# Patient Record
Sex: FEMALE | Race: WHITE | NOT HISPANIC OR LATINO | Employment: OTHER
[De-identification: names, ages, dates, MRNs, and addresses within clinical notes are randomized per-mention and may not be internally consistent; named-entity substitution may affect disease eponyms.]

---

## 2017-01-17 ENCOUNTER — RECORDS - HEALTHEAST (OUTPATIENT)
Dept: ADMINISTRATIVE | Facility: OTHER | Age: 52
End: 2017-01-17

## 2017-01-23 ENCOUNTER — RECORDS - HEALTHEAST (OUTPATIENT)
Dept: ADMINISTRATIVE | Facility: OTHER | Age: 52
End: 2017-01-23

## 2017-02-03 ENCOUNTER — RECORDS - HEALTHEAST (OUTPATIENT)
Dept: ADMINISTRATIVE | Facility: OTHER | Age: 52
End: 2017-02-03

## 2017-02-06 ENCOUNTER — RECORDS - HEALTHEAST (OUTPATIENT)
Dept: ADMINISTRATIVE | Facility: OTHER | Age: 52
End: 2017-02-06

## 2017-05-02 ENCOUNTER — COMMUNICATION - HEALTHEAST (OUTPATIENT)
Dept: TELEHEALTH | Facility: CLINIC | Age: 52
End: 2017-05-02

## 2017-05-02 ENCOUNTER — OFFICE VISIT - HEALTHEAST (OUTPATIENT)
Dept: FAMILY MEDICINE | Facility: CLINIC | Age: 52
End: 2017-05-02

## 2017-05-02 DIAGNOSIS — Z01.818 PREOP EXAMINATION: ICD-10-CM

## 2017-05-02 DIAGNOSIS — Z51.81 MEDICATION MONITORING ENCOUNTER: ICD-10-CM

## 2017-05-02 DIAGNOSIS — R87.619 ABNORMAL PAP SMEAR OF CERVIX: ICD-10-CM

## 2017-05-02 DIAGNOSIS — F30.9 BIPOLAR I DISORDER, SINGLE MANIC EPISODE (H): ICD-10-CM

## 2017-05-02 ASSESSMENT — MIFFLIN-ST. JEOR: SCORE: 1117.33

## 2017-05-03 LAB
ATRIAL RATE - MUSE: 68 BPM
DIASTOLIC BLOOD PRESSURE - MUSE: NORMAL MMHG
INTERPRETATION ECG - MUSE: NORMAL
P AXIS - MUSE: 77 DEGREES
PR INTERVAL - MUSE: 150 MS
QRS DURATION - MUSE: 86 MS
QT - MUSE: 440 MS
QTC - MUSE: 467 MS
R AXIS - MUSE: 53 DEGREES
SYSTOLIC BLOOD PRESSURE - MUSE: NORMAL MMHG
T AXIS - MUSE: 68 DEGREES
VENTRICULAR RATE- MUSE: 68 BPM

## 2017-05-04 ENCOUNTER — COMMUNICATION - HEALTHEAST (OUTPATIENT)
Dept: FAMILY MEDICINE | Facility: CLINIC | Age: 52
End: 2017-05-04

## 2017-05-04 ENCOUNTER — AMBULATORY - HEALTHEAST (OUTPATIENT)
Dept: FAMILY MEDICINE | Facility: CLINIC | Age: 52
End: 2017-05-04

## 2017-05-04 DIAGNOSIS — E87.6 HYPOKALEMIA: ICD-10-CM

## 2017-05-10 ENCOUNTER — AMBULATORY - HEALTHEAST (OUTPATIENT)
Dept: LAB | Facility: CLINIC | Age: 52
End: 2017-05-10

## 2017-05-10 DIAGNOSIS — Z13.220 SCREENING FOR LIPOID DISORDERS: ICD-10-CM

## 2017-05-10 DIAGNOSIS — Z51.81 MEDICATION MONITORING ENCOUNTER: ICD-10-CM

## 2017-05-10 LAB
CHOLEST SERPL-MCNC: 191 MG/DL
FASTING STATUS PATIENT QL REPORTED: YES
HDLC SERPL-MCNC: 62 MG/DL
LDLC SERPL CALC-MCNC: 93 MG/DL
TRIGL SERPL-MCNC: 180 MG/DL

## 2017-09-29 ENCOUNTER — RECORDS - HEALTHEAST (OUTPATIENT)
Dept: ADMINISTRATIVE | Facility: OTHER | Age: 52
End: 2017-09-29

## 2017-10-03 ENCOUNTER — COMMUNICATION - HEALTHEAST (OUTPATIENT)
Dept: FAMILY MEDICINE | Facility: CLINIC | Age: 52
End: 2017-10-03

## 2017-12-19 ENCOUNTER — COMMUNICATION - HEALTHEAST (OUTPATIENT)
Dept: FAMILY MEDICINE | Facility: CLINIC | Age: 52
End: 2017-12-19

## 2017-12-20 ENCOUNTER — COMMUNICATION - HEALTHEAST (OUTPATIENT)
Dept: SCHEDULING | Facility: CLINIC | Age: 52
End: 2017-12-20

## 2017-12-20 ENCOUNTER — COMMUNICATION - HEALTHEAST (OUTPATIENT)
Dept: FAMILY MEDICINE | Facility: CLINIC | Age: 52
End: 2017-12-20

## 2017-12-20 DIAGNOSIS — Z30.9 ENCOUNTER FOR CONTRACEPTIVE MANAGEMENT, UNSPECIFIED TYPE: ICD-10-CM

## 2018-01-26 ENCOUNTER — OFFICE VISIT - HEALTHEAST (OUTPATIENT)
Dept: FAMILY MEDICINE | Facility: CLINIC | Age: 53
End: 2018-01-26

## 2018-01-26 DIAGNOSIS — B00.1 RECURRENT COLD SORES: ICD-10-CM

## 2018-01-26 DIAGNOSIS — Z12.31 VISIT FOR SCREENING MAMMOGRAM: ICD-10-CM

## 2018-01-26 DIAGNOSIS — Z00.00 ANNUAL PHYSICAL EXAM: ICD-10-CM

## 2018-01-26 LAB
ALBUMIN SERPL-MCNC: 3.4 G/DL (ref 3.5–5)
ALP SERPL-CCNC: 45 U/L (ref 45–120)
ALT SERPL W P-5'-P-CCNC: 10 U/L (ref 0–45)
ANION GAP SERPL CALCULATED.3IONS-SCNC: 10 MMOL/L (ref 5–18)
AST SERPL W P-5'-P-CCNC: 14 U/L (ref 0–40)
BILIRUB SERPL-MCNC: 0.2 MG/DL (ref 0–1)
BUN SERPL-MCNC: 26 MG/DL (ref 8–22)
CALCIUM SERPL-MCNC: 8.6 MG/DL (ref 8.5–10.5)
CHLORIDE BLD-SCNC: 110 MMOL/L (ref 98–107)
CHOLEST SERPL-MCNC: 190 MG/DL
CO2 SERPL-SCNC: 20 MMOL/L (ref 22–31)
CREAT SERPL-MCNC: 1.05 MG/DL (ref 0.6–1.1)
FASTING STATUS PATIENT QL REPORTED: NO
GFR SERPL CREATININE-BSD FRML MDRD: 55 ML/MIN/1.73M2
GLUCOSE BLD-MCNC: 101 MG/DL (ref 70–125)
HDLC SERPL-MCNC: 68 MG/DL
LDLC SERPL CALC-MCNC: 87 MG/DL
POTASSIUM BLD-SCNC: 3.9 MMOL/L (ref 3.5–5)
PROT SERPL-MCNC: 6.5 G/DL (ref 6–8)
SODIUM SERPL-SCNC: 140 MMOL/L (ref 136–145)
TRIGL SERPL-MCNC: 175 MG/DL

## 2018-01-26 RX ORDER — VALACYCLOVIR HYDROCHLORIDE 1 G/1
TABLET, FILM COATED ORAL
Qty: 20 TABLET | Refills: 3 | Status: SHIPPED | OUTPATIENT
Start: 2018-01-26

## 2018-01-26 ASSESSMENT — MIFFLIN-ST. JEOR: SCORE: 1108.2

## 2018-03-01 ENCOUNTER — COMMUNICATION - HEALTHEAST (OUTPATIENT)
Dept: FAMILY MEDICINE | Facility: CLINIC | Age: 53
End: 2018-03-01

## 2018-03-01 ENCOUNTER — AMBULATORY - HEALTHEAST (OUTPATIENT)
Dept: LAB | Facility: CLINIC | Age: 53
End: 2018-03-01

## 2018-03-01 DIAGNOSIS — R68.2 DRY MOUTH: ICD-10-CM

## 2018-03-01 DIAGNOSIS — H04.123 CHRONIC DRYNESS OF BOTH EYES: ICD-10-CM

## 2018-03-06 LAB
SS-A/RO AUTOANTIBODIES - HISTORICAL: 1 EU
SS-B/LA AUTOANTIBODIES - HISTORICAL: 0 EU

## 2018-03-12 ENCOUNTER — COMMUNICATION - HEALTHEAST (OUTPATIENT)
Dept: SCHEDULING | Facility: CLINIC | Age: 53
End: 2018-03-12

## 2018-08-16 ENCOUNTER — AMBULATORY - HEALTHEAST (OUTPATIENT)
Dept: FAMILY MEDICINE | Facility: CLINIC | Age: 53
End: 2018-08-16

## 2018-08-16 DIAGNOSIS — Z00.00 PREVENTATIVE HEALTH CARE: ICD-10-CM

## 2018-12-28 ENCOUNTER — RECORDS - HEALTHEAST (OUTPATIENT)
Dept: ADMINISTRATIVE | Facility: OTHER | Age: 53
End: 2018-12-28

## 2019-01-10 ENCOUNTER — COMMUNICATION - HEALTHEAST (OUTPATIENT)
Dept: FAMILY MEDICINE | Facility: CLINIC | Age: 54
End: 2019-01-10

## 2019-04-01 ENCOUNTER — COMMUNICATION - HEALTHEAST (OUTPATIENT)
Dept: FAMILY MEDICINE | Facility: CLINIC | Age: 54
End: 2019-04-01

## 2019-04-02 ENCOUNTER — RECORDS - HEALTHEAST (OUTPATIENT)
Dept: ADMINISTRATIVE | Facility: OTHER | Age: 54
End: 2019-04-02

## 2019-04-04 ENCOUNTER — RECORDS - HEALTHEAST (OUTPATIENT)
Dept: ADMINISTRATIVE | Facility: OTHER | Age: 54
End: 2019-04-04

## 2019-04-28 ENCOUNTER — COMMUNICATION - HEALTHEAST (OUTPATIENT)
Dept: FAMILY MEDICINE | Facility: CLINIC | Age: 54
End: 2019-04-28

## 2019-05-02 ENCOUNTER — COMMUNICATION - HEALTHEAST (OUTPATIENT)
Dept: FAMILY MEDICINE | Facility: CLINIC | Age: 54
End: 2019-05-02

## 2019-05-14 ENCOUNTER — OFFICE VISIT - HEALTHEAST (OUTPATIENT)
Dept: FAMILY MEDICINE | Facility: CLINIC | Age: 54
End: 2019-05-14

## 2019-05-14 DIAGNOSIS — F30.9 BIPOLAR I DISORDER, SINGLE MANIC EPISODE (H): ICD-10-CM

## 2019-05-14 DIAGNOSIS — E78.1 PURE HYPERTRIGLYCERIDEMIA: ICD-10-CM

## 2019-05-14 DIAGNOSIS — Z00.00 ANNUAL PHYSICAL EXAM: ICD-10-CM

## 2019-05-14 DIAGNOSIS — N95.9 MENOPAUSAL AND POSTMENOPAUSAL DISORDER: ICD-10-CM

## 2019-05-14 LAB
ALBUMIN SERPL-MCNC: 4 G/DL (ref 3.5–5)
ALP SERPL-CCNC: 65 U/L (ref 45–120)
ALT SERPL W P-5'-P-CCNC: 20 U/L (ref 0–45)
ANION GAP SERPL CALCULATED.3IONS-SCNC: 12 MMOL/L (ref 5–18)
AST SERPL W P-5'-P-CCNC: 27 U/L (ref 0–40)
BASOPHILS # BLD AUTO: 0 THOU/UL (ref 0–0.2)
BASOPHILS NFR BLD AUTO: 1 % (ref 0–2)
BILIRUB SERPL-MCNC: 0.3 MG/DL (ref 0–1)
BUN SERPL-MCNC: 28 MG/DL (ref 8–22)
CALCIUM SERPL-MCNC: 9.3 MG/DL (ref 8.5–10.5)
CHLORIDE BLD-SCNC: 106 MMOL/L (ref 98–107)
CHOLEST SERPL-MCNC: 228 MG/DL
CO2 SERPL-SCNC: 20 MMOL/L (ref 22–31)
CREAT SERPL-MCNC: 1.15 MG/DL (ref 0.6–1.1)
EOSINOPHIL # BLD AUTO: 0.4 THOU/UL (ref 0–0.4)
EOSINOPHIL NFR BLD AUTO: 7 % (ref 0–6)
ERYTHROCYTE [DISTWIDTH] IN BLOOD BY AUTOMATED COUNT: 13.1 % (ref 11–14.5)
FASTING STATUS PATIENT QL REPORTED: YES
FSH SERPL-ACNC: 55.4 MIU/ML
GFR SERPL CREATININE-BSD FRML MDRD: 49 ML/MIN/1.73M2
GLUCOSE BLD-MCNC: 84 MG/DL (ref 70–125)
HCT VFR BLD AUTO: 42.7 % (ref 35–47)
HDLC SERPL-MCNC: 66 MG/DL
HGB BLD-MCNC: 14 G/DL (ref 12–16)
LDLC SERPL CALC-MCNC: 146 MG/DL
LYMPHOCYTES # BLD AUTO: 1.9 THOU/UL (ref 0.8–4.4)
LYMPHOCYTES NFR BLD AUTO: 32 % (ref 20–40)
MCH RBC QN AUTO: 32.4 PG (ref 27–34)
MCHC RBC AUTO-ENTMCNC: 32.8 G/DL (ref 32–36)
MCV RBC AUTO: 99 FL (ref 80–100)
MONOCYTES # BLD AUTO: 0.5 THOU/UL (ref 0–0.9)
MONOCYTES NFR BLD AUTO: 8 % (ref 2–10)
NEUTROPHILS # BLD AUTO: 3.2 THOU/UL (ref 2–7.7)
NEUTROPHILS NFR BLD AUTO: 53 % (ref 50–70)
PLATELET # BLD AUTO: 222 THOU/UL (ref 140–440)
PMV BLD AUTO: 7.3 FL (ref 7–10)
POTASSIUM BLD-SCNC: 4 MMOL/L (ref 3.5–5)
PROT SERPL-MCNC: 6.9 G/DL (ref 6–8)
RBC # BLD AUTO: 4.32 MILL/UL (ref 3.8–5.4)
SODIUM SERPL-SCNC: 138 MMOL/L (ref 136–145)
TRIGL SERPL-MCNC: 80 MG/DL
WBC: 6 THOU/UL (ref 4–11)

## 2019-05-14 ASSESSMENT — MIFFLIN-ST. JEOR: SCORE: 1085.97

## 2019-05-15 LAB
25(OH)D3 SERPL-MCNC: 32.9 NG/ML (ref 30–80)
25(OH)D3 SERPL-MCNC: 32.9 NG/ML (ref 30–80)

## 2019-06-03 ENCOUNTER — COMMUNICATION - HEALTHEAST (OUTPATIENT)
Dept: FAMILY MEDICINE | Facility: CLINIC | Age: 54
End: 2019-06-03

## 2019-06-04 ENCOUNTER — AMBULATORY - HEALTHEAST (OUTPATIENT)
Dept: FAMILY MEDICINE | Facility: CLINIC | Age: 54
End: 2019-06-04

## 2019-06-04 ENCOUNTER — COMMUNICATION - HEALTHEAST (OUTPATIENT)
Dept: FAMILY MEDICINE | Facility: CLINIC | Age: 54
End: 2019-06-04

## 2019-06-05 ENCOUNTER — COMMUNICATION - HEALTHEAST (OUTPATIENT)
Dept: FAMILY MEDICINE | Facility: CLINIC | Age: 54
End: 2019-06-05

## 2019-09-23 ENCOUNTER — RECORDS - HEALTHEAST (OUTPATIENT)
Dept: ADMINISTRATIVE | Facility: OTHER | Age: 54
End: 2019-09-23

## 2019-11-11 ENCOUNTER — COMMUNICATION - HEALTHEAST (OUTPATIENT)
Dept: SCHEDULING | Facility: CLINIC | Age: 54
End: 2019-11-11

## 2019-11-12 ENCOUNTER — OFFICE VISIT - HEALTHEAST (OUTPATIENT)
Dept: FAMILY MEDICINE | Facility: CLINIC | Age: 54
End: 2019-11-12

## 2019-11-12 DIAGNOSIS — N95.1 MENOPAUSAL SYNDROME (HOT FLASHES): ICD-10-CM

## 2019-11-12 DIAGNOSIS — M79.89 SWELLING OF BOTH HANDS: ICD-10-CM

## 2019-11-12 DIAGNOSIS — F41.9 ANXIETY: ICD-10-CM

## 2019-11-12 DIAGNOSIS — R52 BODY ACHES: ICD-10-CM

## 2019-11-12 DIAGNOSIS — M25.552 HIP PAIN, LEFT: ICD-10-CM

## 2019-11-12 DIAGNOSIS — Z23 IMMUNIZATION DUE: ICD-10-CM

## 2019-11-12 DIAGNOSIS — R53.82 CHRONIC FATIGUE: ICD-10-CM

## 2019-11-12 DIAGNOSIS — Z11.59 ENCOUNTER FOR HEPATITIS C SCREENING TEST FOR LOW RISK PATIENT: ICD-10-CM

## 2019-11-12 DIAGNOSIS — Z00.00 PREVENTATIVE HEALTH CARE: ICD-10-CM

## 2019-11-12 LAB
ALBUMIN UR-MCNC: NEGATIVE MG/DL
APPEARANCE UR: ABNORMAL
BILIRUB UR QL STRIP: NEGATIVE
COLOR UR AUTO: YELLOW
ERYTHROCYTE [DISTWIDTH] IN BLOOD BY AUTOMATED COUNT: 12.7 % (ref 11–14.5)
ERYTHROCYTE [SEDIMENTATION RATE] IN BLOOD BY WESTERGREN METHOD: 8 MM/HR (ref 0–20)
GLUCOSE UR STRIP-MCNC: NEGATIVE MG/DL
HCT VFR BLD AUTO: 38.2 % (ref 35–47)
HGB BLD-MCNC: 13 G/DL (ref 12–16)
HGB UR QL STRIP: NEGATIVE
KETONES UR STRIP-MCNC: NEGATIVE MG/DL
LEUKOCYTE ESTERASE UR QL STRIP: NEGATIVE
MCH RBC QN AUTO: 32.5 PG (ref 27–34)
MCHC RBC AUTO-ENTMCNC: 34.1 G/DL (ref 32–36)
MCV RBC AUTO: 95 FL (ref 80–100)
NITRATE UR QL: NEGATIVE
PH UR STRIP: 8.5 [PH] (ref 5–8)
PLATELET # BLD AUTO: 209 THOU/UL (ref 140–440)
PMV BLD AUTO: 7 FL (ref 7–10)
RBC # BLD AUTO: 4.01 MILL/UL (ref 3.8–5.4)
SP GR UR STRIP: 1.01 (ref 1–1.03)
UROBILINOGEN UR STRIP-ACNC: ABNORMAL
WBC: 5.2 THOU/UL (ref 4–11)

## 2019-11-12 ASSESSMENT — MIFFLIN-ST. JEOR: SCORE: 1097.77

## 2019-11-13 LAB
ALBUMIN SERPL-MCNC: 3.9 G/DL (ref 3.5–5)
ALP SERPL-CCNC: 57 U/L (ref 45–120)
ALT SERPL W P-5'-P-CCNC: 24 U/L (ref 0–45)
ANION GAP SERPL CALCULATED.3IONS-SCNC: 9 MMOL/L (ref 5–18)
AST SERPL W P-5'-P-CCNC: 26 U/L (ref 0–40)
BILIRUB SERPL-MCNC: 0.2 MG/DL (ref 0–1)
BUN SERPL-MCNC: 33 MG/DL (ref 8–22)
CALCIUM SERPL-MCNC: 9 MG/DL (ref 8.5–10.5)
CHLORIDE BLD-SCNC: 109 MMOL/L (ref 98–107)
CK SERPL-CCNC: 68 U/L (ref 30–190)
CO2 SERPL-SCNC: 24 MMOL/L (ref 22–31)
CREAT SERPL-MCNC: 1.16 MG/DL (ref 0.6–1.1)
GFR SERPL CREATININE-BSD FRML MDRD: 49 ML/MIN/1.73M2
GLUCOSE BLD-MCNC: 88 MG/DL (ref 70–125)
POTASSIUM BLD-SCNC: 4.5 MMOL/L (ref 3.5–5)
PROT SERPL-MCNC: 6.6 G/DL (ref 6–8)
SODIUM SERPL-SCNC: 142 MMOL/L (ref 136–145)
TSH SERPL DL<=0.005 MIU/L-ACNC: 3.11 UIU/ML (ref 0.3–5)

## 2019-11-14 LAB — HCV AB SERPL QL IA: NEGATIVE

## 2019-11-20 ENCOUNTER — COMMUNICATION - HEALTHEAST (OUTPATIENT)
Dept: SCHEDULING | Facility: CLINIC | Age: 54
End: 2019-11-20

## 2019-12-10 ENCOUNTER — COMMUNICATION - HEALTHEAST (OUTPATIENT)
Dept: FAMILY MEDICINE | Facility: CLINIC | Age: 54
End: 2019-12-10

## 2019-12-20 ENCOUNTER — OFFICE VISIT - HEALTHEAST (OUTPATIENT)
Dept: FAMILY MEDICINE | Facility: CLINIC | Age: 54
End: 2019-12-20

## 2019-12-20 DIAGNOSIS — R68.2 DRY MOUTH: ICD-10-CM

## 2019-12-20 DIAGNOSIS — M79.642 PAIN IN BOTH HANDS: ICD-10-CM

## 2019-12-20 DIAGNOSIS — F41.0 PANIC ATTACK: ICD-10-CM

## 2019-12-20 DIAGNOSIS — H18.529: ICD-10-CM

## 2019-12-20 DIAGNOSIS — M79.641 PAIN IN BOTH HANDS: ICD-10-CM

## 2019-12-20 DIAGNOSIS — F41.9 ANXIETY: ICD-10-CM

## 2019-12-20 DIAGNOSIS — F51.04 PSYCHOPHYSIOLOGICAL INSOMNIA: ICD-10-CM

## 2019-12-20 DIAGNOSIS — H04.123 DRY EYES: ICD-10-CM

## 2019-12-20 DIAGNOSIS — N95.1 MENOPAUSAL SYNDROME (HOT FLASHES): ICD-10-CM

## 2019-12-23 ENCOUNTER — COMMUNICATION - HEALTHEAST (OUTPATIENT)
Dept: FAMILY MEDICINE | Facility: CLINIC | Age: 54
End: 2019-12-23

## 2019-12-23 DIAGNOSIS — M25.50 ARTHRALGIA OF MULTIPLE JOINTS: ICD-10-CM

## 2020-01-06 ENCOUNTER — RECORDS - HEALTHEAST (OUTPATIENT)
Dept: ADMINISTRATIVE | Facility: OTHER | Age: 55
End: 2020-01-06

## 2020-01-08 ENCOUNTER — RECORDS - HEALTHEAST (OUTPATIENT)
Dept: ADMINISTRATIVE | Facility: OTHER | Age: 55
End: 2020-01-08

## 2020-01-10 ENCOUNTER — COMMUNICATION - HEALTHEAST (OUTPATIENT)
Dept: FAMILY MEDICINE | Facility: CLINIC | Age: 55
End: 2020-01-10

## 2020-01-21 ENCOUNTER — OFFICE VISIT - HEALTHEAST (OUTPATIENT)
Dept: FAMILY MEDICINE | Facility: CLINIC | Age: 55
End: 2020-01-21

## 2020-01-21 DIAGNOSIS — M25.552 PAIN OF BOTH HIP JOINTS: ICD-10-CM

## 2020-01-21 DIAGNOSIS — M79.642 PAIN IN BOTH HANDS: ICD-10-CM

## 2020-01-21 DIAGNOSIS — F41.9 ANXIETY: ICD-10-CM

## 2020-01-21 DIAGNOSIS — M25.551 PAIN OF BOTH HIP JOINTS: ICD-10-CM

## 2020-01-21 DIAGNOSIS — E07.9 THYROID DISEASE: ICD-10-CM

## 2020-01-21 DIAGNOSIS — M19.019 ARTHROPATHY OF SHOULDER REGION: ICD-10-CM

## 2020-01-21 DIAGNOSIS — R79.89 ELEVATED SERUM CREATININE: ICD-10-CM

## 2020-01-21 DIAGNOSIS — M79.641 PAIN IN BOTH HANDS: ICD-10-CM

## 2020-01-21 DIAGNOSIS — R53.82 CHRONIC FATIGUE: ICD-10-CM

## 2020-01-21 LAB
ANION GAP SERPL CALCULATED.3IONS-SCNC: 8 MMOL/L (ref 5–18)
BUN SERPL-MCNC: 25 MG/DL (ref 8–22)
CALCIUM SERPL-MCNC: 9.5 MG/DL (ref 8.5–10.5)
CHLORIDE BLD-SCNC: 106 MMOL/L (ref 98–107)
CO2 SERPL-SCNC: 27 MMOL/L (ref 22–31)
CREAT SERPL-MCNC: 1.18 MG/DL (ref 0.6–1.1)
GFR SERPL CREATININE-BSD FRML MDRD: 48 ML/MIN/1.73M2
GLUCOSE BLD-MCNC: 100 MG/DL (ref 70–125)
POTASSIUM BLD-SCNC: 4.2 MMOL/L (ref 3.5–5)
SODIUM SERPL-SCNC: 141 MMOL/L (ref 136–145)
T3 SERPL-MCNC: 58 NG/DL (ref 45–175)
T4 FREE SERPL-MCNC: 0.5 NG/DL (ref 0.7–1.8)
T4 TOTAL - HISTORICAL: <3 UG/DL (ref 4.5–13)
TSH SERPL DL<=0.005 MIU/L-ACNC: 2.66 UIU/ML (ref 0.3–5)

## 2020-01-23 ENCOUNTER — COMMUNICATION - HEALTHEAST (OUTPATIENT)
Dept: FAMILY MEDICINE | Facility: CLINIC | Age: 55
End: 2020-01-23

## 2020-01-23 ENCOUNTER — RECORDS - HEALTHEAST (OUTPATIENT)
Dept: ADMINISTRATIVE | Facility: OTHER | Age: 55
End: 2020-01-23

## 2020-01-24 ENCOUNTER — COMMUNICATION - HEALTHEAST (OUTPATIENT)
Dept: ADMINISTRATIVE | Facility: CLINIC | Age: 55
End: 2020-01-24

## 2020-02-03 ENCOUNTER — COMMUNICATION - HEALTHEAST (OUTPATIENT)
Dept: FAMILY MEDICINE | Facility: CLINIC | Age: 55
End: 2020-02-03

## 2020-02-11 ENCOUNTER — OFFICE VISIT - HEALTHEAST (OUTPATIENT)
Dept: FAMILY MEDICINE | Facility: CLINIC | Age: 55
End: 2020-02-11

## 2020-02-11 DIAGNOSIS — F51.04 PSYCHOPHYSIOLOGICAL INSOMNIA: ICD-10-CM

## 2020-02-11 DIAGNOSIS — F41.0 PANIC ATTACK: ICD-10-CM

## 2020-02-11 DIAGNOSIS — F30.9 BIPOLAR I DISORDER, SINGLE MANIC EPISODE (H): ICD-10-CM

## 2020-02-11 DIAGNOSIS — R87.611 PAP SMEAR OF CERVIX WITH ASCUS, CANNOT EXCLUDE HGSIL: ICD-10-CM

## 2020-02-11 RX ORDER — LORAZEPAM 0.5 MG/1
0.5 TABLET ORAL EVERY 8 HOURS PRN
Qty: 30 TABLET | Refills: 0 | Status: SHIPPED | OUTPATIENT
Start: 2020-02-11

## 2020-02-11 ASSESSMENT — MIFFLIN-ST. JEOR: SCORE: 1088.24

## 2020-02-12 LAB
HPV SOURCE: NORMAL
HUMAN PAPILLOMA VIRUS 16 DNA: NEGATIVE
HUMAN PAPILLOMA VIRUS 18 DNA: NEGATIVE
HUMAN PAPILLOMA VIRUS FINAL DIAGNOSIS: NORMAL
HUMAN PAPILLOMA VIRUS OTHER HR: NEGATIVE
SPECIMEN DESCRIPTION: NORMAL

## 2020-02-17 ENCOUNTER — OFFICE VISIT - HEALTHEAST (OUTPATIENT)
Dept: RHEUMATOLOGY | Facility: CLINIC | Age: 55
End: 2020-02-17

## 2020-02-17 ENCOUNTER — RECORDS - HEALTHEAST (OUTPATIENT)
Dept: GENERAL RADIOLOGY | Facility: CLINIC | Age: 55
End: 2020-02-17

## 2020-02-17 DIAGNOSIS — M70.62 TROCHANTERIC BURSITIS, LEFT HIP: ICD-10-CM

## 2020-02-17 DIAGNOSIS — R20.2 PARESTHESIA OF UPPER EXTREMITY: ICD-10-CM

## 2020-02-17 DIAGNOSIS — M79.641 PAIN IN BOTH HANDS: ICD-10-CM

## 2020-02-17 DIAGNOSIS — M79.642 PAIN IN BOTH HANDS: ICD-10-CM

## 2020-02-17 DIAGNOSIS — M54.50 CHRONIC LEFT-SIDED LOW BACK PAIN WITHOUT SCIATICA: ICD-10-CM

## 2020-02-17 DIAGNOSIS — G89.29 CHRONIC LEFT-SIDED LOW BACK PAIN WITHOUT SCIATICA: ICD-10-CM

## 2020-02-17 DIAGNOSIS — H18.599 OTHER HEREDITARY CORNEAL DYSTROPHIES: ICD-10-CM

## 2020-02-17 DIAGNOSIS — N28.9 RENAL INSUFFICIENCY: ICD-10-CM

## 2020-02-17 DIAGNOSIS — M79.642 PAIN IN LEFT HAND: ICD-10-CM

## 2020-02-17 DIAGNOSIS — M79.641 PAIN IN RIGHT HAND: ICD-10-CM

## 2020-02-17 DIAGNOSIS — R20.2 PARESTHESIA OF RIGHT LOWER EXTREMITY: ICD-10-CM

## 2020-02-17 DIAGNOSIS — M65.341 TRIGGER RING FINGER OF RIGHT HAND: ICD-10-CM

## 2020-02-17 DIAGNOSIS — H18.529: ICD-10-CM

## 2020-02-17 LAB
ALT SERPL W P-5'-P-CCNC: 31 U/L (ref 0–45)
AST SERPL W P-5'-P-CCNC: 37 U/L (ref 0–40)
C REACTIVE PROTEIN LHE: 0.3 MG/DL (ref 0–0.8)
CREAT SERPL-MCNC: 1.22 MG/DL (ref 0.6–1.1)
ERYTHROCYTE [DISTWIDTH] IN BLOOD BY AUTOMATED COUNT: 11.9 % (ref 11–14.5)
ERYTHROCYTE [SEDIMENTATION RATE] IN BLOOD BY WESTERGREN METHOD: 1 MM/HR (ref 0–20)
FOLATE SERPL-MCNC: 15.8 NG/ML
GFR SERPL CREATININE-BSD FRML MDRD: 46 ML/MIN/1.73M2
HCT VFR BLD AUTO: 43.6 % (ref 35–47)
HGB BLD-MCNC: 14 G/DL (ref 12–16)
MCH RBC QN AUTO: 31.1 PG (ref 27–34)
MCHC RBC AUTO-ENTMCNC: 32 G/DL (ref 32–36)
MCV RBC AUTO: 97 FL (ref 80–100)
PLATELET # BLD AUTO: 249 THOU/UL (ref 140–440)
PMV BLD AUTO: 7 FL (ref 7–10)
RBC # BLD AUTO: 4.49 MILL/UL (ref 3.8–5.4)
RHEUMATOID FACT SERPL-ACNC: <15 IU/ML (ref 0–30)
URATE SERPL-MCNC: 4.9 MG/DL (ref 2–7.5)
VIT B12 SERPL-MCNC: 641 PG/ML (ref 213–816)
WBC: 4.9 THOU/UL (ref 4–11)

## 2020-02-17 ASSESSMENT — MIFFLIN-ST. JEOR: SCORE: 1093.69

## 2020-02-18 LAB
ANA SER QL: 0.1 U
B BURGDOR IGG+IGM SER QL: 0.07 INDEX VALUE
CCP AB SER IA-ACNC: <0.5 U/ML

## 2020-02-19 LAB
ACE SERPL-CCNC: 31 U/L (ref 9–67)
ANCA IGG TITR SER IF: NORMAL {TITER}
B LOCUS: NORMAL
B27TEST METHOD: NORMAL

## 2020-02-20 LAB
BKR LAB AP ABNORMAL BLEEDING: NO
BKR LAB AP BIRTH CONTROL/HORMONES: NORMAL
BKR LAB AP CERVICAL APPEARANCE: NORMAL
BKR LAB AP GYN ADEQUACY: NORMAL
BKR LAB AP GYN INTERPRETATION: NORMAL
BKR LAB AP HPV REFLEX: NORMAL
BKR LAB AP LMP: NORMAL
BKR LAB AP PATIENT STATUS: NORMAL
BKR LAB AP PREVIOUS ABNORMAL: NORMAL
BKR LAB AP PREVIOUS NORMAL: NORMAL
HIGH RISK?: YES
PATH REPORT.COMMENTS IMP SPEC: NORMAL
RESULT FLAG (HE HISTORICAL CONVERSION): NORMAL

## 2020-02-26 ENCOUNTER — COMMUNICATION - HEALTHEAST (OUTPATIENT)
Dept: RHEUMATOLOGY | Facility: CLINIC | Age: 55
End: 2020-02-26

## 2020-02-26 DIAGNOSIS — M79.641 PAIN IN BOTH HANDS: ICD-10-CM

## 2020-02-26 DIAGNOSIS — M79.642 PAIN IN BOTH HANDS: ICD-10-CM

## 2020-02-27 ENCOUNTER — OFFICE VISIT - HEALTHEAST (OUTPATIENT)
Dept: OCCUPATIONAL THERAPY | Facility: REHABILITATION | Age: 55
End: 2020-02-27

## 2020-02-27 DIAGNOSIS — Z78.9 DECREASED ACTIVITIES OF DAILY LIVING (ADL): ICD-10-CM

## 2020-02-27 DIAGNOSIS — R29.898 WEAKNESS OF BOTH HANDS: ICD-10-CM

## 2020-02-27 DIAGNOSIS — M79.644 PAIN IN FINGER OF BOTH HANDS: ICD-10-CM

## 2020-02-27 DIAGNOSIS — M79.645 PAIN IN FINGER OF BOTH HANDS: ICD-10-CM

## 2020-02-28 ENCOUNTER — OFFICE VISIT - HEALTHEAST (OUTPATIENT)
Dept: OCCUPATIONAL THERAPY | Facility: REHABILITATION | Age: 55
End: 2020-02-28

## 2020-02-28 DIAGNOSIS — R29.898 WEAKNESS OF BOTH HANDS: ICD-10-CM

## 2020-02-28 DIAGNOSIS — M79.645 PAIN IN FINGER OF BOTH HANDS: ICD-10-CM

## 2020-02-28 DIAGNOSIS — Z78.9 DECREASED ACTIVITIES OF DAILY LIVING (ADL): ICD-10-CM

## 2020-02-28 DIAGNOSIS — M79.644 PAIN IN FINGER OF BOTH HANDS: ICD-10-CM

## 2020-03-02 ENCOUNTER — COMMUNICATION - HEALTHEAST (OUTPATIENT)
Dept: FAMILY MEDICINE | Facility: CLINIC | Age: 55
End: 2020-03-02

## 2020-03-10 ENCOUNTER — OFFICE VISIT - HEALTHEAST (OUTPATIENT)
Dept: OCCUPATIONAL THERAPY | Facility: REHABILITATION | Age: 55
End: 2020-03-10

## 2020-03-10 DIAGNOSIS — R29.898 WEAKNESS OF BOTH HANDS: ICD-10-CM

## 2020-03-10 DIAGNOSIS — Z78.9 DECREASED ACTIVITIES OF DAILY LIVING (ADL): ICD-10-CM

## 2020-03-10 DIAGNOSIS — M79.644 PAIN IN FINGER OF BOTH HANDS: ICD-10-CM

## 2020-03-10 DIAGNOSIS — M79.645 PAIN IN FINGER OF BOTH HANDS: ICD-10-CM

## 2020-04-08 ENCOUNTER — COMMUNICATION - HEALTHEAST (OUTPATIENT)
Dept: LAB | Facility: CLINIC | Age: 55
End: 2020-04-08

## 2020-04-09 ENCOUNTER — AMBULATORY - HEALTHEAST (OUTPATIENT)
Dept: LAB | Facility: CLINIC | Age: 55
End: 2020-04-09

## 2020-04-09 DIAGNOSIS — M79.642 PAIN IN BOTH HANDS: ICD-10-CM

## 2020-04-09 DIAGNOSIS — M79.641 PAIN IN BOTH HANDS: ICD-10-CM

## 2020-04-09 LAB
CREAT SERPL-MCNC: 1.21 MG/DL (ref 0.6–1.1)
GFR SERPL CREATININE-BSD FRML MDRD: 46 ML/MIN/1.73M2

## 2020-04-15 ENCOUNTER — COMMUNICATION - HEALTHEAST (OUTPATIENT)
Dept: FAMILY MEDICINE | Facility: CLINIC | Age: 55
End: 2020-04-15

## 2020-04-15 ENCOUNTER — COMMUNICATION - HEALTHEAST (OUTPATIENT)
Dept: RHEUMATOLOGY | Facility: CLINIC | Age: 55
End: 2020-04-15

## 2020-04-16 ENCOUNTER — OFFICE VISIT - HEALTHEAST (OUTPATIENT)
Dept: FAMILY MEDICINE | Facility: CLINIC | Age: 55
End: 2020-04-16

## 2020-04-16 DIAGNOSIS — E07.9 DISORDER OF THYROID: ICD-10-CM

## 2020-04-16 DIAGNOSIS — N18.30 CHRONIC KIDNEY DISEASE, STAGE III (MODERATE) (H): ICD-10-CM

## 2020-04-16 DIAGNOSIS — M12.9 ARTHRITIS/ARTHROPATHY OF MULTIPLE JOINTS: ICD-10-CM

## 2020-04-16 DIAGNOSIS — F41.1 GENERALIZED ANXIETY DISORDER: ICD-10-CM

## 2020-04-16 DIAGNOSIS — F32.9 REACTIVE DEPRESSION: ICD-10-CM

## 2020-04-16 DIAGNOSIS — F43.0 ACUTE REACTION TO STRESS: ICD-10-CM

## 2020-04-16 DIAGNOSIS — F31.81 BIPOLAR 2 DISORDER (H): ICD-10-CM

## 2020-04-22 ENCOUNTER — RECORDS - HEALTHEAST (OUTPATIENT)
Dept: ADMINISTRATIVE | Facility: OTHER | Age: 55
End: 2020-04-22

## 2020-04-27 ENCOUNTER — COMMUNICATION - HEALTHEAST (OUTPATIENT)
Dept: FAMILY MEDICINE | Facility: CLINIC | Age: 55
End: 2020-04-27

## 2020-04-28 ENCOUNTER — COMMUNICATION - HEALTHEAST (OUTPATIENT)
Dept: RHEUMATOLOGY | Facility: CLINIC | Age: 55
End: 2020-04-28

## 2020-04-28 DIAGNOSIS — R20.2 PARESTHESIA OF UPPER EXTREMITY: ICD-10-CM

## 2020-04-28 DIAGNOSIS — R20.2 PARESTHESIA OF RIGHT LOWER EXTREMITY: ICD-10-CM

## 2020-04-30 ENCOUNTER — OFFICE VISIT - HEALTHEAST (OUTPATIENT)
Dept: OCCUPATIONAL THERAPY | Facility: REHABILITATION | Age: 55
End: 2020-04-30

## 2020-04-30 DIAGNOSIS — Z78.9 DECREASED ACTIVITIES OF DAILY LIVING (ADL): ICD-10-CM

## 2020-04-30 DIAGNOSIS — M79.645 PAIN IN FINGER OF BOTH HANDS: ICD-10-CM

## 2020-04-30 DIAGNOSIS — R29.898 WEAKNESS OF BOTH HANDS: ICD-10-CM

## 2020-04-30 DIAGNOSIS — M79.644 PAIN IN FINGER OF BOTH HANDS: ICD-10-CM

## 2020-05-04 ENCOUNTER — COMMUNICATION - HEALTHEAST (OUTPATIENT)
Dept: FAMILY MEDICINE | Facility: CLINIC | Age: 55
End: 2020-05-04

## 2020-05-05 ENCOUNTER — COMMUNICATION - HEALTHEAST (OUTPATIENT)
Dept: RHEUMATOLOGY | Facility: CLINIC | Age: 55
End: 2020-05-05

## 2020-05-05 ENCOUNTER — OFFICE VISIT - HEALTHEAST (OUTPATIENT)
Dept: RHEUMATOLOGY | Facility: CLINIC | Age: 55
End: 2020-05-05

## 2020-05-05 DIAGNOSIS — R20.2 PARESTHESIA OF UPPER EXTREMITY: ICD-10-CM

## 2020-05-05 DIAGNOSIS — R20.2 PARESTHESIA OF RIGHT LOWER EXTREMITY: ICD-10-CM

## 2020-05-13 ENCOUNTER — RECORDS - HEALTHEAST (OUTPATIENT)
Dept: ADMINISTRATIVE | Facility: OTHER | Age: 55
End: 2020-05-13

## 2020-07-27 ENCOUNTER — COMMUNICATION - HEALTHEAST (OUTPATIENT)
Dept: RHEUMATOLOGY | Facility: CLINIC | Age: 55
End: 2020-07-27

## 2020-07-27 DIAGNOSIS — R20.2 PARESTHESIA OF UPPER EXTREMITY: ICD-10-CM

## 2020-07-27 DIAGNOSIS — R20.2 PARESTHESIA OF RIGHT LOWER EXTREMITY: ICD-10-CM

## 2020-08-10 ENCOUNTER — OFFICE VISIT - HEALTHEAST (OUTPATIENT)
Dept: FAMILY MEDICINE | Facility: CLINIC | Age: 55
End: 2020-08-10

## 2020-08-10 DIAGNOSIS — E55.9 VITAMIN D DEFICIENCY: ICD-10-CM

## 2020-08-10 DIAGNOSIS — Z79.890 HORMONE REPLACEMENT THERAPY (HRT): ICD-10-CM

## 2020-08-10 DIAGNOSIS — F30.9 BIPOLAR I DISORDER, SINGLE MANIC EPISODE (H): ICD-10-CM

## 2020-08-10 DIAGNOSIS — Z12.31 ENCOUNTER FOR SCREENING MAMMOGRAM FOR BREAST CANCER: ICD-10-CM

## 2020-08-10 DIAGNOSIS — F43.0 ACUTE STRESS DISORDER: ICD-10-CM

## 2020-08-10 DIAGNOSIS — N18.30 CHRONIC KIDNEY DISEASE, STAGE III (MODERATE) (H): ICD-10-CM

## 2020-08-10 DIAGNOSIS — N95.9 MENOPAUSAL AND POSTMENOPAUSAL DISORDER: ICD-10-CM

## 2020-08-10 RX ORDER — QUETIAPINE FUMARATE 300 MG/1
TABLET, FILM COATED ORAL
Status: SHIPPED | COMMUNITY
Start: 2020-05-31 | End: 2023-02-27

## 2020-08-10 RX ORDER — DULOXETIN HYDROCHLORIDE 30 MG/1
CAPSULE, DELAYED RELEASE ORAL
Status: SHIPPED | COMMUNITY
Start: 2020-07-14 | End: 2021-07-22

## 2020-08-10 ASSESSMENT — ANXIETY QUESTIONNAIRES
GAD7 TOTAL SCORE: 12
6. BECOMING EASILY ANNOYED OR IRRITABLE: MORE THAN HALF THE DAYS
IF YOU CHECKED OFF ANY PROBLEMS ON THIS QUESTIONNAIRE, HOW DIFFICULT HAVE THESE PROBLEMS MADE IT FOR YOU TO DO YOUR WORK, TAKE CARE OF THINGS AT HOME, OR GET ALONG WITH OTHER PEOPLE: VERY DIFFICULT
5. BEING SO RESTLESS THAT IT IS HARD TO SIT STILL: SEVERAL DAYS
7. FEELING AFRAID AS IF SOMETHING AWFUL MIGHT HAPPEN: SEVERAL DAYS
4. TROUBLE RELAXING: MORE THAN HALF THE DAYS
2. NOT BEING ABLE TO STOP OR CONTROL WORRYING: MORE THAN HALF THE DAYS
3. WORRYING TOO MUCH ABOUT DIFFERENT THINGS: MORE THAN HALF THE DAYS
1. FEELING NERVOUS, ANXIOUS, OR ON EDGE: MORE THAN HALF THE DAYS

## 2020-08-10 ASSESSMENT — PATIENT HEALTH QUESTIONNAIRE - PHQ9: SUM OF ALL RESPONSES TO PHQ QUESTIONS 1-9: 16

## 2020-08-11 ENCOUNTER — COMMUNICATION - HEALTHEAST (OUTPATIENT)
Dept: FAMILY MEDICINE | Facility: CLINIC | Age: 55
End: 2020-08-11

## 2020-08-12 ENCOUNTER — COMMUNICATION - HEALTHEAST (OUTPATIENT)
Dept: LAB | Facility: CLINIC | Age: 55
End: 2020-08-12

## 2020-08-13 ENCOUNTER — AMBULATORY - HEALTHEAST (OUTPATIENT)
Dept: LAB | Facility: CLINIC | Age: 55
End: 2020-08-13

## 2020-08-13 DIAGNOSIS — F30.9 BIPOLAR I DISORDER, SINGLE MANIC EPISODE (H): ICD-10-CM

## 2020-08-13 DIAGNOSIS — N18.30 CHRONIC KIDNEY DISEASE, STAGE III (MODERATE) (H): ICD-10-CM

## 2020-08-13 DIAGNOSIS — E55.9 VITAMIN D DEFICIENCY: ICD-10-CM

## 2020-08-13 LAB
ANION GAP SERPL CALCULATED.3IONS-SCNC: 10 MMOL/L (ref 5–18)
BUN SERPL-MCNC: 23 MG/DL (ref 8–22)
CALCIUM SERPL-MCNC: 8.5 MG/DL (ref 8.5–10.5)
CHLORIDE BLD-SCNC: 110 MMOL/L (ref 98–107)
CHOLEST SERPL-MCNC: 199 MG/DL
CO2 SERPL-SCNC: 20 MMOL/L (ref 22–31)
CREAT SERPL-MCNC: 1.32 MG/DL (ref 0.6–1.1)
FASTING STATUS PATIENT QL REPORTED: YES
GFR SERPL CREATININE-BSD FRML MDRD: 42 ML/MIN/1.73M2
GLUCOSE BLD-MCNC: 79 MG/DL (ref 70–125)
HBA1C MFR BLD: 4.7 %
HDLC SERPL-MCNC: 63 MG/DL
LDLC SERPL CALC-MCNC: 113 MG/DL
POTASSIUM BLD-SCNC: 4.3 MMOL/L (ref 3.5–5)
SODIUM SERPL-SCNC: 140 MMOL/L (ref 136–145)
TRIGL SERPL-MCNC: 115 MG/DL

## 2020-08-14 LAB
25(OH)D3 SERPL-MCNC: 53.7 NG/ML (ref 30–80)
25(OH)D3 SERPL-MCNC: 53.7 NG/ML (ref 30–80)

## 2020-08-20 ENCOUNTER — COMMUNICATION - HEALTHEAST (OUTPATIENT)
Dept: FAMILY MEDICINE | Facility: CLINIC | Age: 55
End: 2020-08-20

## 2020-08-20 DIAGNOSIS — R19.5 CHANGE IN CONSISTENCY OF STOOL: ICD-10-CM

## 2020-08-20 DIAGNOSIS — R10.9 ABDOMINAL CRAMPING: ICD-10-CM

## 2020-08-24 ENCOUNTER — COMMUNICATION - HEALTHEAST (OUTPATIENT)
Dept: FAMILY MEDICINE | Facility: CLINIC | Age: 55
End: 2020-08-24

## 2020-08-27 ENCOUNTER — RECORDS - HEALTHEAST (OUTPATIENT)
Dept: ADMINISTRATIVE | Facility: OTHER | Age: 55
End: 2020-08-27

## 2020-08-28 ENCOUNTER — RECORDS - HEALTHEAST (OUTPATIENT)
Dept: ADMINISTRATIVE | Facility: OTHER | Age: 55
End: 2020-08-28

## 2020-08-28 ENCOUNTER — HOSPITAL ENCOUNTER (OUTPATIENT)
Dept: MAMMOGRAPHY | Facility: CLINIC | Age: 55
Discharge: HOME | End: 2020-08-28
Attending: FAMILY MEDICINE

## 2020-08-28 DIAGNOSIS — Z12.31 ENCOUNTER FOR SCREENING MAMMOGRAM FOR BREAST CANCER: ICD-10-CM

## 2020-08-31 ENCOUNTER — RECORDS - HEALTHEAST (OUTPATIENT)
Dept: ADMINISTRATIVE | Facility: OTHER | Age: 55
End: 2020-08-31

## 2020-08-31 LAB
ALT SERPL W/O P-5'-P-CCNC: 21 U/L (ref 0–78)
AST SERPL-CCNC: 39 U/L (ref 0–37)

## 2020-09-03 ENCOUNTER — HOSPITAL ENCOUNTER (OUTPATIENT)
Dept: RADIOLOGY | Facility: CLINIC | Age: 55
Discharge: HOME | End: 2020-09-03
Attending: INTERNAL MEDICINE

## 2020-09-03 DIAGNOSIS — R19.4 CHANGE IN BOWEL HABIT: ICD-10-CM

## 2020-09-03 DIAGNOSIS — R10.32 LEFT LOWER QUADRANT ABDOMINAL PAIN: ICD-10-CM

## 2020-09-04 ENCOUNTER — RECORDS - HEALTHEAST (OUTPATIENT)
Dept: HEALTH INFORMATION MANAGEMENT | Facility: CLINIC | Age: 55
End: 2020-09-04

## 2020-09-08 ENCOUNTER — RECORDS - HEALTHEAST (OUTPATIENT)
Dept: ADMINISTRATIVE | Facility: OTHER | Age: 55
End: 2020-09-08

## 2020-09-09 ENCOUNTER — HOSPITAL ENCOUNTER (OUTPATIENT)
Dept: MAMMOGRAPHY | Facility: CLINIC | Age: 55
Discharge: HOME | End: 2020-09-09
Attending: FAMILY MEDICINE

## 2020-09-09 ENCOUNTER — HOSPITAL ENCOUNTER (OUTPATIENT)
Dept: ULTRASOUND IMAGING | Facility: CLINIC | Age: 55
Discharge: HOME | End: 2020-09-09
Attending: FAMILY MEDICINE

## 2020-09-09 DIAGNOSIS — N64.89 BREAST ASYMMETRY: ICD-10-CM

## 2020-10-06 ENCOUNTER — RECORDS - HEALTHEAST (OUTPATIENT)
Dept: ADMINISTRATIVE | Facility: OTHER | Age: 55
End: 2020-10-06

## 2020-10-06 ENCOUNTER — VIRTUAL VISIT (OUTPATIENT)
Dept: FAMILY MEDICINE | Facility: OTHER | Age: 55
End: 2020-10-06

## 2020-10-06 ENCOUNTER — COMMUNICATION - HEALTHEAST (OUTPATIENT)
Dept: FAMILY MEDICINE | Facility: CLINIC | Age: 55
End: 2020-10-06

## 2020-10-06 NOTE — PROGRESS NOTES
"Date: 10/06/2020 11:53:45  Clinician: Julien Pulido  Clinician NPI: [id number]  Patient: Catherine Marte  Patient : 1965  Patient Address: [address] [place] [address] [address]., [place] [id number]  Patient Phone: [phone]  Visit Protocol: URI  Patient Summary:  Catherine is a 55 year old ( : 1965 ) female who initiated a [facility] Visit for COVID-19 (Coronavirus) evaluation and screening. When asked the question \"Please sign me up to receive news, health information and promotions from [facility].\", Catherine responded \"No\".    Catherine states her symptoms started 1-2 days ago.   Her symptoms consist of a headache, a cough, nasal congestion, rhinitis, facial pain or pressure, myalgia, malaise, and tooth pain.   Symptom details     Nasal secretions: The color of her mucus is white.    Cough: Catherine coughs a few times an hour and her cough is more bothersome at night. Phlegm comes into her throat when she coughs. She does not believe her cough is caused by post-nasal drip. The color of the phlegm is yellow.     Facial pain or pressure: The facial pain or pressure does not feel worse when bending or leaning forward.     Headache: She states the headache is moderate (4-6 on a 10 point pain scale).     Tooth pain: The tooth pain is not caused by a cavity, recent dental work, or other mouth problems.      Catherine denies having ear pain, wheezing, fever, anosmia, vomiting, nausea, chills, sore throat, ageusia, and diarrhea. She also denies taking antibiotic medication in the past month and having recent facial or sinus surgery in the past 60 days. She is not experiencing dyspnea.   Precipitating events  She has not recently been exposed to someone with influenza. Catherine has been in close contact with the following high risk individuals: adults 65 or older and immunocompromised people.   Pertinent COVID-19 (Coronavirus) information  In the past 14 days, Catherine has not worked in a congregate living setting.   She does not " work or volunteer as healthcare worker or a  and does not work or volunteer in a healthcare facility.   Catherine also has not lived in a congregate living setting in the past 14 days. She does not live with a healthcare worker.   Catherine has not had a close contact with a laboratory-confirmed COVID-19 patient within 14 days of symptom onset.   Since December 2019, Catherine and has not had upper respiratory infection or influenza-like illness. Has not been diagnosed with lab-confirmed COVID-19 test   Pertinent medical history  Catherine typically gets a yeast infection when she takes antibiotics. She has used fluconazole (Diflucan) to treat previous yeast infections. She is not sure if she has needed 1 or 2 doses of fluconazole (Diflucan) for symptoms to resolve in the past.   Catherine does not need a return to work/school note.   Weight: 115 lbs   Catherine does not smoke or use smokeless tobacco.   Additional information as reported by the patient (free text): My headache is increasingly bad just in these last 5 minutes   Weight: 115 lbs    MEDICATIONS: Seroquel oral, Cymbalta oral, Lamictal oral, Wellbutrin SR oral, Topamax oral, ALLERGIES: Sulfacet-R  Clinician Response:  Dear Catherine,   Your symptoms show that you may have coronavirus (COVID-19). This illness can cause fever, cough and trouble breathing. Many people get a mild case and get better on their own. Some people can get very sick.  What should I do?  We would like to test you for this virus.   1. Please call [phone] to schedule your visit. Explain that you were referred by [facility] to have a COVID-19 test. Be ready to share your [facility] visit ID number.  The following will serve as your written order for this COVID Test, ordered by me, for the indication of suspected COVID [Z20.828]: The test will be ordered in [employer], our electronic health record, after you are scheduled. It will show as ordered and authorized by Hussein Troncoso MD.  Order:  "COVID-19 (Coronavirus) PCR for SYMPTOMATIC testing from [facility].      2. When it's time for your COVID test:  Stay at least 6 feet away from others. (If someone will drive you to your test, stay in the backseat, as far away from the  as you can.)   Cover your mouth and nose with a mask, tissue or washcloth.  Go straight to the testing site. Don't make any stops on the way there or back.      3.Starting now: Stay home and away from others (self-isolate) until:   You've had no fever---and no medicine that reduces fever---for one full day (24 hours). And...   Your other symptoms have gotten better. For example, your cough or breathing has improved. And...   At least 10 days have passed since your symptoms started.       During this time, don't leave the house except for testing or medical care.   Stay in your own room, even for meals. Use your own bathroom if you can.   Stay away from others in your home. No hugging, kissing or shaking hands. No visitors.  Don't go to work, school or anywhere else.    Clean \"high touch\" surfaces often (doorknobs, counters, handles, etc.). Use a household cleaning spray or wipes. You'll find a full list of  on the EPA website: www.epa.gov/pesticide-registration/list-n-disinfectants-use-against-sars-cov-2.   Cover your mouth and nose with a mask, tissue or washcloth to avoid spreading germs.  Wash your hands and face often. Use soap and water.  Caregivers in these groups are at risk for severe illness due to COVID-19:  o People 65 years and older  o People who live in a nursing home or long-term care facility  o People with chronic disease (lung, heart, cancer, diabetes, kidney, liver, immunologic)  o People who have a weakened immune system, including those who:   Are in cancer treatment  Take medicine that weakens the immune system, such as corticosteroids  Had a bone marrow or organ transplant  Have an immune deficiency  Have poorly controlled HIV or AIDS  Are obese (body " mass index of 40 or higher)  Smoke regularly   o Caregivers should wear gloves while washing dishes, handling laundry and cleaning bedrooms and bathrooms.  o Use caution when washing and drying laundry: Don't shake dirty laundry, and use the warmest water setting that you can.  o For more tips, go to www.cdc.gov/coronavirus/2019-ncov/downloads/10Things.pdf.    4.Sign up for [employer]. We know it's scary to hear that you might have COVID-19. We want to track your symptoms to make sure you're okay over the next 2 weeks. Please look for an email from [employer]---this is a free, online program that we'll use to keep in touch. To sign up, follow the link in the email. Learn more at [email or web address]  How can I take care of myself?   Get lots of rest. Drink extra fluids (unless a doctor has told you not to).   Take Tylenol (acetaminophen) for fever or pain. If you have liver or kidney problems, ask your family doctor if it's okay to take Tylenol.   Adults can take either:    650 mg (two 325 mg pills) every 4 to 6 hours, or...   1,000 mg (two 500 mg pills) every 8 hours as needed.    Note: Don't take more than 3,000 mg in one day. Acetaminophen is found in many medicines (both prescribed and over-the-counter medicines). Read all labels to be sure you don't take too much.   For children, check the Tylenol bottle for the right dose. The dose is based on the child's age or weight.    If you have other health problems (like cancer, heart failure, an organ transplant or severe kidney disease): Call your specialty clinic if you don't feel better in the next 2 days.       Know when to call 911. Emergency warning signs include:    Trouble breathing or shortness of breath Pain or pressure in the chest that doesn't go away Feeling confused like you haven't felt before, or not being able to wake up Bluish-colored lips or face.  Where can I get more information?   [facility] -- About COVID-19:  [email or web address]   CDC -- What to Do If You're Sick: www.cdc.gov/coronavirus/2019-ncov/about/steps-when-sick.html   CDC -- Ending Home Isolation: www.cdc.gov/coronavirus/2019-ncov/hcp/disposition-in-home-patients.html   CDC -- Caring for Someone: www.cdc.gov/coronavirus/2019-ncov/if-you-are-sick/care-for-someone.html   [facility] -- Interim Guidance for Hospital Discharge to Home: www.[facility].mn./diseases/coronavirus/hcp/hospdischarge.pdf   [facility] clinical trials (COVID-19 research studies): [email or web address].[facility]-clinical-trials    Below are the COVID-19 hotlines at the [place] [facility] ([facility]). Interpreters are available.    For health questions: Call 195-642-6138 or 1-685.647.3632 (7 a.m. to 7 p.m.) For questions about schools and childcare: Call 777-586-0387 or 1-157.271.8280 (7 a.m. to 7 p.m.)    Diagnosis: Cough  Diagnosis ICD: R05

## 2020-10-09 ENCOUNTER — AMBULATORY - HEALTHEAST (OUTPATIENT)
Dept: FAMILY MEDICINE | Facility: CLINIC | Age: 55
End: 2020-10-09

## 2020-10-09 ENCOUNTER — RECORDS - HEALTHEAST (OUTPATIENT)
Dept: ADMINISTRATIVE | Facility: OTHER | Age: 55
End: 2020-10-09

## 2020-10-09 DIAGNOSIS — Z20.822 SUSPECTED COVID-19 VIRUS INFECTION: ICD-10-CM

## 2020-10-10 ENCOUNTER — COMMUNICATION - HEALTHEAST (OUTPATIENT)
Dept: SCHEDULING | Facility: CLINIC | Age: 55
End: 2020-10-10

## 2020-10-19 ENCOUNTER — RECORDS - HEALTHEAST (OUTPATIENT)
Dept: ADMINISTRATIVE | Facility: OTHER | Age: 55
End: 2020-10-19

## 2020-12-10 ENCOUNTER — COMMUNICATION - HEALTHEAST (OUTPATIENT)
Dept: FAMILY MEDICINE | Facility: CLINIC | Age: 55
End: 2020-12-10

## 2020-12-10 DIAGNOSIS — Z79.890 HORMONE REPLACEMENT THERAPY (HRT): ICD-10-CM

## 2021-02-25 ENCOUNTER — OFFICE VISIT - HEALTHEAST (OUTPATIENT)
Dept: FAMILY MEDICINE | Facility: CLINIC | Age: 56
End: 2021-02-25

## 2021-02-25 DIAGNOSIS — R20.2 PARESTHESIA OF RIGHT LOWER EXTREMITY: ICD-10-CM

## 2021-02-25 DIAGNOSIS — N18.32 STAGE 3B CHRONIC KIDNEY DISEASE (H): ICD-10-CM

## 2021-02-25 DIAGNOSIS — Z13.220 LIPID SCREENING: ICD-10-CM

## 2021-02-25 DIAGNOSIS — Z79.890 HORMONE REPLACEMENT THERAPY (HRT): ICD-10-CM

## 2021-02-25 DIAGNOSIS — R20.2 PARESTHESIA OF UPPER EXTREMITY: ICD-10-CM

## 2021-02-25 DIAGNOSIS — R41.3 POOR SHORT TERM MEMORY: ICD-10-CM

## 2021-02-25 DIAGNOSIS — Z87.42 HISTORY OF ABNORMAL CERVICAL PAP SMEAR: ICD-10-CM

## 2021-02-25 DIAGNOSIS — F30.9 BIPOLAR I DISORDER, SINGLE MANIC EPISODE (H): ICD-10-CM

## 2021-02-25 RX ORDER — GABAPENTIN 300 MG/1
CAPSULE ORAL
Qty: 270 CAPSULE | Refills: 0 | Status: SHIPPED | OUTPATIENT
Start: 2021-02-25 | End: 2022-02-22

## 2021-02-25 RX ORDER — LAMOTRIGINE 200 MG/1
200 TABLET ORAL DAILY
Status: SHIPPED | COMMUNITY
Start: 2021-02-25

## 2021-02-25 ASSESSMENT — ANXIETY QUESTIONNAIRES
2. NOT BEING ABLE TO STOP OR CONTROL WORRYING: MORE THAN HALF THE DAYS
IF YOU CHECKED OFF ANY PROBLEMS ON THIS QUESTIONNAIRE, HOW DIFFICULT HAVE THESE PROBLEMS MADE IT FOR YOU TO DO YOUR WORK, TAKE CARE OF THINGS AT HOME, OR GET ALONG WITH OTHER PEOPLE: SOMEWHAT DIFFICULT
GAD7 TOTAL SCORE: 9
4. TROUBLE RELAXING: SEVERAL DAYS
5. BEING SO RESTLESS THAT IT IS HARD TO SIT STILL: NOT AT ALL
3. WORRYING TOO MUCH ABOUT DIFFERENT THINGS: MORE THAN HALF THE DAYS
7. FEELING AFRAID AS IF SOMETHING AWFUL MIGHT HAPPEN: SEVERAL DAYS
1. FEELING NERVOUS, ANXIOUS, OR ON EDGE: SEVERAL DAYS
6. BECOMING EASILY ANNOYED OR IRRITABLE: MORE THAN HALF THE DAYS

## 2021-02-25 ASSESSMENT — MIFFLIN-ST. JEOR: SCORE: 1089.84

## 2021-02-25 ASSESSMENT — PATIENT HEALTH QUESTIONNAIRE - PHQ9: SUM OF ALL RESPONSES TO PHQ QUESTIONS 1-9: 14

## 2021-02-26 ENCOUNTER — AMBULATORY - HEALTHEAST (OUTPATIENT)
Dept: OTHER | Facility: CLINIC | Age: 56
End: 2021-02-26

## 2021-02-26 ENCOUNTER — DOCUMENTATION ONLY (OUTPATIENT)
Dept: OTHER | Facility: CLINIC | Age: 56
End: 2021-02-26

## 2021-02-26 LAB
ALBUMIN SERPL-MCNC: 4 G/DL (ref 3.5–5)
ALP SERPL-CCNC: 60 U/L (ref 45–120)
ALT SERPL W P-5'-P-CCNC: 19 U/L (ref 0–45)
ANION GAP SERPL CALCULATED.3IONS-SCNC: 7 MMOL/L (ref 5–18)
AST SERPL W P-5'-P-CCNC: 25 U/L (ref 0–40)
BILIRUB SERPL-MCNC: 0.2 MG/DL (ref 0–1)
BUN SERPL-MCNC: 33 MG/DL (ref 8–22)
CALCIUM SERPL-MCNC: 8.9 MG/DL (ref 8.5–10.5)
CHLORIDE BLD-SCNC: 105 MMOL/L (ref 98–107)
CHOLEST SERPL-MCNC: 244 MG/DL
CO2 SERPL-SCNC: 28 MMOL/L (ref 22–31)
CREAT SERPL-MCNC: 1.39 MG/DL (ref 0.6–1.1)
FASTING STATUS PATIENT QL REPORTED: NO
GFR SERPL CREATININE-BSD FRML MDRD: 39 ML/MIN/1.73M2
GLUCOSE BLD-MCNC: 91 MG/DL (ref 70–125)
HDLC SERPL-MCNC: 69 MG/DL
LDLC SERPL CALC-MCNC: 142 MG/DL
POTASSIUM BLD-SCNC: 4.2 MMOL/L (ref 3.5–5)
PROT SERPL-MCNC: 6.8 G/DL (ref 6–8)
SODIUM SERPL-SCNC: 140 MMOL/L (ref 136–145)
TRIGL SERPL-MCNC: 165 MG/DL

## 2021-02-28 ENCOUNTER — COMMUNICATION - HEALTHEAST (OUTPATIENT)
Dept: FAMILY MEDICINE | Facility: CLINIC | Age: 56
End: 2021-02-28

## 2021-05-27 ENCOUNTER — RECORDS - HEALTHEAST (OUTPATIENT)
Dept: ADMINISTRATIVE | Facility: CLINIC | Age: 56
End: 2021-05-27

## 2021-05-27 ASSESSMENT — PATIENT HEALTH QUESTIONNAIRE - PHQ9
SUM OF ALL RESPONSES TO PHQ QUESTIONS 1-9: 16
SUM OF ALL RESPONSES TO PHQ QUESTIONS 1-9: 14

## 2021-05-27 NOTE — TELEPHONE ENCOUNTER
RN cannot approve Refill Request    RN can NOT refill this medication PCP messaged that patient is overdue for Office Visit and Physical .      Sariah Moseley, [facility] Triage/Med Refill 4/2/2019    Requested Prescriptions   Pending Prescriptions Disp Refills     levonorgestrel-ethinyl estradiol (ENPRESSE) 50-30 (6)/75-40 (5)/125-30(10) per tablet [Pharmacy Med Name: LEVONOR/ETHINYL ESTRADIOL TABS 28S] 84 tablet 0     Sig: TAKE 1 TABLET BY MOUTH EVERY DAY    Oral Contraceptives Protocol Failed - 4/1/2019  9:03 AM       Failed - Visit with PCP or prescribing provider visit in last 12 months     Last office visit with prescriber/PCP: Visit date not found OR same dept: Visit date not found OR same specialty: 5/18/2016 Terrell Arias MD  Last physical: 1/26/2018 Last MTM visit: Visit date not found   Next visit within 3 mo: Visit date not found  Next physical within 3 mo: Visit date not found  Prescriber OR PCP: Marychuy Marion MD ([place])  Last diagnosis associated with med order: 1. Contraception  - levonorgestrel-ethinyl estradiol (ENPRESSE) 50-30 (6)/75-40 (5)/125-30(10) per tablet [Pharmacy Med Name: LEVONOR/ETHINYL ESTRADIOL TABS 28S]; TAKE 1 TABLET BY MOUTH EVERY DAY  Dispense: 84 tablet; Refill: 0    If protocol passes may refill for 12 months if within 3 months of last provider visit (or a total of 15 months).

## 2021-05-28 ENCOUNTER — RECORDS - HEALTHEAST (OUTPATIENT)
Dept: ADMINISTRATIVE | Facility: CLINIC | Age: 56
End: 2021-05-28

## 2021-05-28 ASSESSMENT — ANXIETY QUESTIONNAIRES
GAD7 TOTAL SCORE: 12
GAD7 TOTAL SCORE: 9

## 2021-05-28 NOTE — TELEPHONE ENCOUNTER
Upcoming Appointment Question  When is the appointment: 5/14/2019  What is your appointment for?: Physical  Who is your appointment scheduled with?: PCP only  What is your question/concern?: The patient has been off of her birth control for 2 weeks. The patient would like to know if she stays off of the birth control until her appointment if there can be a lab draw to determine what phase of menopause she is in. The patient would like to know as she has been experiencing night sweats three nights per week. The patient has asked for a return call to her work number [phone].   Okay to leave a detailed message?: No

## 2021-05-28 NOTE — TELEPHONE ENCOUNTER
RN cannot approve Refill Request    RN can NOT refill this medication overdue for office visits and/or labs.    Jose Restrepo, [facility] Triage/Med Refill 4/29/2019    Requested Prescriptions   Pending Prescriptions Disp Refills     levonorgestrel-ethinyl estradiol (ENPRESSE) 50-30 (6)/75-40 (5)/125-30(10) per tablet [Pharmacy Med Name: LEVONOR/ETHINYL ESTRADIOL TABS 28S] 28 tablet 0     Sig: TAKE ONE TABLET BY MOUTH ONCE DAILY       Oral Contraceptives Protocol Failed - 4/28/2019  2:02 PM        Failed - Visit with PCP or prescribing provider visit in last 12 months      Last office visit with prescriber/PCP: Visit date not found OR same dept: Visit date not found OR same specialty: 5/18/2016 Terrell Arias MD  Last physical: 5/2/2017 Last MTM visit: Visit date not found   Next visit within 3 mo: Visit date not found  Next physical within 3 mo: Visit date not found  Prescriber OR PCP: Minerva López MD  Last diagnosis associated with med order: 1. Contraception  - levonorgestrel-ethinyl estradiol (ENPRESSE) 50-30 (6)/75-40 (5)/125-30(10) per tablet [Pharmacy Med Name: LEVONOR/ETHINYL ESTRADIOL TABS 28S]; TAKE ONE TABLET BY MOUTH ONCE DAILY  Dispense: 28 tablet; Refill: 0    If protocol passes may refill for 12 months if within 3 months of last provider visit (or a total of 15 months).

## 2021-05-28 NOTE — TELEPHONE ENCOUNTER
Please call patient and have her make an appointment with me for a med check or annual physical.  I cannot refill meds until I have seen her as she has not been seen in well over a year.

## 2021-05-28 NOTE — TELEPHONE ENCOUNTER
Patient Returning Call  Reason for call:  Return call  Information relayed to patient:  Patient was informed of Marychuy Marion MD's message about checking the [facility]. Patient stated she had an annual physical coming up and she will do the lab at that appointment.  Patient has additional questions:  yes  If YES, what are your questions/concerns:  Please place the [facility] lab order.  Okay to leave a detailed message?: No call back needed
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
## 2021-05-28 NOTE — TELEPHONE ENCOUNTER
She should continue off the birth control and I will check an FSH lab.  I am uncertain exactly how long it would take to get an accurate FSH result after discontinuation of the birth control, but I think it is worth trying to get.  I believe 2 weeks should be long enough, but if need be, we can always re-do another FSH later as well.

## 2021-05-29 ENCOUNTER — RECORDS - HEALTHEAST (OUTPATIENT)
Dept: ADMINISTRATIVE | Facility: CLINIC | Age: 56
End: 2021-05-29

## 2021-05-29 NOTE — TELEPHONE ENCOUNTER
Question following Office Visit  When did you see your provider: 5/14/19  What is your question: Patient is requesting Marychuy Marion MD to return my call regarding:    The birth control that never got to my pharmacy. Patient stated This was to be refilled in the appointment but the pharmacy never got this. Please send to [facility] in [place].    I also have questions with my lab results that I need to discuss with Marychuy Marion MD nurse.   Okay to leave a detailed message: Yes

## 2021-05-29 NOTE — TELEPHONE ENCOUNTER
? If med is to be continued    Rx renewed per Medication Renewal Policy. Medication was last renewed on 4/29/19.    Sariah Moseley, [facility] Connection Triage/Med Refill 6/4/2019     Requested Prescriptions   Pending Prescriptions Disp Refills     levonorgestrel-ethinyl estradiol (ENPRESSE) 50-30 (6)/75-40 (5)/125-30(10) per tablet [Pharmacy Med Name: LEVONOR/ETHINYL ESTRADIOL TABS 28S] 28 tablet 0     Sig: TAKE ONE TABLET BY MOUTH ONCE DAILY       Oral Contraceptives Protocol Passed - 6/3/2019  8:27 AM        Passed - Visit with PCP or prescribing provider visit in last 12 months      Last office visit with prescriber/PCP: Visit date not found OR same dept: Visit date not found OR same specialty: 5/18/2016 Terrell Arias MD  Last physical: 5/14/2019 Last MTM visit: Visit date not found   Next visit within 3 mo: Visit date not found  Next physical within 3 mo: Visit date not found  Prescriber OR PCP: Marychuy Marion MD ([place])  Last diagnosis associated with med order: 1. Contraception  - levonorgestrel-ethinyl estradiol (ENPRESSE) 50-30 (6)/75-40 (5)/125-30(10) per tablet [Pharmacy Med Name: LEVONOR/ETHINYL ESTRADIOL TABS 28S]; TAKE ONE TABLET BY MOUTH ONCE DAILY  Dispense: 28 tablet; Refill: 0    If protocol passes may refill for 12 months if within 3 months of last provider visit (or a total of 15 months).

## 2021-05-29 NOTE — TELEPHONE ENCOUNTER
Called and left a message on the patient's personal cell phone and not at her work phone.  I have refilled her medication.  I thought we were holding off until we got lab work back.  FSH does show she is postmenopausal.  Also let her know that she could use my chart to communicate with me or make an appointment so that I can address her questions.

## 2021-05-29 NOTE — TELEPHONE ENCOUNTER
Medication Request  Medication name:ENPRESSE  Pharmacy Name and Location: [id number]  Reason for request: Patient states that she is traveling to [place] next month with her family requesting the birthcontrol pill proscription for one more moth that she wont mess up her trip . Pt had physical on 5/14/19 .  When did you use medication last?:  Unknown  Patient offered appointment:  physical exam on 5/14/19  Okay to leave a detailed message: no

## 2021-05-29 NOTE — TELEPHONE ENCOUNTER
Patient Returning Call  Reason for call:  The patient is returning the call.  Information relayed to patient:  Below message has been relayed to the patient.    Patient has additional questions:  No  If YES, what are your questions/concerns:  NA  Okay to leave a detailed message?: No call back needed

## 2021-05-30 VITALS — BODY MASS INDEX: 20.71 KG/M2 | WEIGHT: 121.31 LBS | HEIGHT: 64 IN

## 2021-05-31 VITALS — WEIGHT: 119.3 LBS | BODY MASS INDEX: 20.37 KG/M2 | HEIGHT: 64 IN

## 2021-06-03 VITALS — HEIGHT: 64 IN | BODY MASS INDEX: 19.53 KG/M2 | WEIGHT: 114.4 LBS

## 2021-06-03 VITALS
DIASTOLIC BLOOD PRESSURE: 70 MMHG | HEART RATE: 65 BPM | TEMPERATURE: 98.1 F | WEIGHT: 117 LBS | BODY MASS INDEX: 19.97 KG/M2 | OXYGEN SATURATION: 98 % | SYSTOLIC BLOOD PRESSURE: 96 MMHG | HEIGHT: 64 IN

## 2021-06-03 NOTE — TELEPHONE ENCOUNTER
Call dropped, poor phone reception. Will try back later today.    If patient calls back, please help schedule

## 2021-06-03 NOTE — PROGRESS NOTES
SUBJECTIVE: Catherine Marte is a 54 y.o. White or  female who presents today in a very emotional state.  She is extremely stressed out.  She notes that this is the worst year she is ever had at her work.  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  In fact she says she saw 75 kids in the health office today.  She had asked to see 11 kids in the special ed room.  In fact she has to straight cath one child twice daily.  She is in charge of all the special education [employer] medical issues.  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 She has been a school nurse for 29 years and has always loved it and now she is completely frazzled.  She feels she is not getting the support she needs particularly from the principal.  She had an incident with her after putting [employer] cards and each of the teachers mailboxes and apparently the principal remove them and told her she could not do this.    She complains that a few weeks ago she was having body aches and weakness.  Her hands were swollen and painful and she could not even use the .  She has particularly been having problems with her hips and the pain is deep, perhaps in the joint.  We discussed possibly doing x-rays.  She has had left hip sciatica she believes.  She also has aching in her legs and shoulders.  She describes having these body aches after having worked out quite heavily at the [employer].  She also belongs to [employer] but she feels she needs a more structured workout.  She has been in so much pain that she had to cancel the last couple of workout at [employer] and so has not been there for a couple weeks.  She describes going through a period where her muscles were so tired that they did not recover between the workouts.  We discussed the possible that she  "had some rhabdomyolysis.  She denies any changes in her urine, at least that she was aware of.  We discussed this can cause kidney issues.    On top of everything else she is going through menopause and is having hot flashes particularly at bedtime.  She stopped her oral birth control a while back.  She complains of memory loss.  Particularly she cannot remember names.  She left her purse at work today.  She thinks it could be related to an increase in some of her medications including Topamax.  She has a history of bipolar disorder.  She has had issues with sleeping and so she has had increases of Seroquel at night as well.  She is also on Wellbutrin 450 mg, Prozac 60 mg, Topamax 300 mg and Seroquel 200 mg.  She is seen by a psychiatrist and also has a therapist.  She has a future appointment with her psychiatrist for December 27.  Her  retired 6 years ago and she does not feel she can stop working because she is only 54.  She does not know how she can continue working at this level.  Her mom and dad are still alive and so she may be long lived.    OBJECTIVE: BP 96/70   Pulse 65   Temp 98.1  F (36.7  C) (Oral)   Ht 5' 3.5\" (1.613 m)   Wt 117 lb (53.1 kg)   LMP 04/20/2019 (Exact Date)   SpO2 98%   BMI 20.40 kg/m    General: Completely stressed out appearing, tearful middle-aged thin female in quite a bit of emotional distress  HEENT: Eyes reddened, swollen from crying  Heart: Regular rate and rhythm without murmur  Lungs: Clear bilaterally  Abdomen: Soft  Extremities: Warm, dry and without edema  Psych: Frustrated, anxious, fatigued, tearful, sometimes angry    ASSESSMENT & PLAN:     1. Anxiety  Patient is on many meds already and I am unwilling to modify anything at this time.  Patient does have a therapist she is seeing.    2. Swelling of both hands  I will do lab work to rule out medical causes  - Comprehensive Metabolic Panel  - HM2(CBC w/o Differential)  - Thyroid Cascade    3. Body aches  I " believe patient may have had some rhabdomyolysis at one time, and I am not sure how long/how much time has passed since she is describing the episode of muscles not recovering between exercise.  - CK Total  - Erythrocyte Sedimentation Rate    4. Hip pain, left  We will consider doing future x-rays if pain continues and/or worsens    5. Chronic fatigue  This is probably related to her emotional state  - Urinalysis Macroscopic  - Thyroid Cascade    6. Menopausal syndrome (hot flashes)  Patient is likely menopausal and this may have come on quickly as she stopped her OCP.    7. 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  - Influenza,Seasonal,Quad,INJ =/>6months    8. Encounter for hepatitis C screening test for low risk patient  - Hepatitis C Antibody (Anti-HCV)    9.  Preventative care  Patient is in need of a mammogram and we also need to get records from [facility] for her most recent Pap and HPV.    I will get back to her on lab results.  I gave his much emotional support as possible as she is in a very difficult situation.  She should follow-up according to her lab results.  If results are noncontributory, I would like to see her back for annual physical no later than May.    40 minutes spent together with the patient today, more than 50% spent in counseling, discussing the above topics.        Patient Active Problem List   Diagnosis     Bipolar Disorder     Fatigue     Tinea Versicolor     Fainting (Syncope)     Menopausal Disorder     Cerv Pap Smear (+) Atyp Glandular Cells Undetermined Signif     Recurrent cold sores     Surveillance of previously prescribed contraceptive pill     History of HPV infection     Pap smear of cervix with ASCUS, cannot exclude HGSIL     History of squamous cell carcinoma       Current Outpatient Medications on File Prior to Visit   Medication Sig Dispense Refill     buPROPion (WELLBUTRIN XL) 150 MG 24 hr tablet  TAKE 3 TABLETS BEDTIME       FLUoxetine (PROZAC) 20 MG capsule TAKE 3 CAPSULE DAILY       QUEtiapine (SEROQUEL) 100 MG tablet Take 300 mg by mouth at bedtime.              QUEtiapine (SEROQUEL) 50 MG tablet Take one tablet by mouth as needed for anxiety.       topiramate (TOPAMAX) 100 MG tablet TAKE 3 TABLET BEDTIME       cholecalciferol, vitamin D3, 5,000 unit Tab Take 1 tablet by mouth daily as needed.              valACYclovir (VALTREX) 1000 MG tablet Two tablets twice a day for one day for recurrent cold sores. (Patient taking differently: Two tablets twice a day for one day for recurrent cold sores as needed.      ) 20 tablet 3     No current facility-administered medications on file prior to visit.

## 2021-06-03 NOTE — TELEPHONE ENCOUNTER
"In to see Dr. Marion last week.  She reports, she was in for several issues, but menopausal symptoms was on the top of the list.  Symptoms still include.; several hot flashes during most of the day, and at night.  She is reporting that today;  So many hot flashes , she needed to come home from work.    Not taking any thing for menopause.    She states she discussed with Dr. Chino     She requests; \"is there anything I can take , prescription? Wise ? It is very hard to deal with this everyday.\"    Pharmacy listed.  Janis Ram RN  [facility] Triage/refill nurse        Reason for Disposition    [1] NIGHT SWEATS occur (e.g., drenching sweat that occurs at night and has to change bed clothes or bed sheets) AND [2] cause unknown    Protocols used: [id number]      "

## 2021-06-03 NOTE — TELEPHONE ENCOUNTER
"Patient calling.  She is reporting her hips are very sore, suddenly, and her hands feel swollen and painful in the morning.   They are not swollen though.  She is reporting that it hurts to walk.     She reports, that she is working out, and wonders if this has something to do with it,    \"This has come on recently.\" \"My body is aching a lot too\"  \"Hard to open bottle.\"  Feels like they are swollen, but they are not swollen.  Having 1-4 hot flashes a day., going through Menopause, and wonders if this has anything to do with it.  Asking for an appointment , and appointment was made for tomorrow afternoon at 3:20 with Dr Marychuy Marion.    Janis Ram RN  [facility] Triage/refill nurse        "

## 2021-06-03 NOTE — TELEPHONE ENCOUNTER
We discussed a multitude of things at our appt.  Have her make an appt with me in late Dec when I'm here and she is off work and we can go over risks and benefits of treatment.  Give her a 40 min appt please.

## 2021-06-04 VITALS — DIASTOLIC BLOOD PRESSURE: 60 MMHG | SYSTOLIC BLOOD PRESSURE: 94 MMHG | HEART RATE: 68 BPM

## 2021-06-04 VITALS
BODY MASS INDEX: 19.82 KG/M2 | SYSTOLIC BLOOD PRESSURE: 100 MMHG | WEIGHT: 116.1 LBS | DIASTOLIC BLOOD PRESSURE: 64 MMHG | HEIGHT: 64 IN | HEART RATE: 68 BPM

## 2021-06-04 VITALS
SYSTOLIC BLOOD PRESSURE: 120 MMHG | WEIGHT: 116.6 LBS | HEART RATE: 64 BPM | DIASTOLIC BLOOD PRESSURE: 70 MMHG | BODY MASS INDEX: 20.33 KG/M2

## 2021-06-04 VITALS
HEART RATE: 64 BPM | DIASTOLIC BLOOD PRESSURE: 56 MMHG | HEIGHT: 64 IN | WEIGHT: 114.9 LBS | SYSTOLIC BLOOD PRESSURE: 98 MMHG | BODY MASS INDEX: 19.62 KG/M2

## 2021-06-04 NOTE — PROGRESS NOTES
SUBJECTIVE: Catherine Marte is a 54 y.o. White or  female who presents today for follow-up on our recent visit.  See that dictation for details.  She has been going through some very emotional times and stressors due to menopausal reasons along with changes at work.  She is very stressed with the amount of work needed to be done.  She had been taking birth control but abruptly stopped and now she is having hot flashes on a regular basis.  We discussed reinitiating the birth control pill versus taking estradiol and progesterone.  She thinks she would be happy just to go back on the birth control pill as it would be free for her as opposed to the cost of hormone replacement therapy.  She has a history of bipolar disorder and she does have a future appointment scheduled as she needs to have her emotional status addressed and she has an established relationship with a psychiatrist who is the one that is currently treating her with depression meds.  She is having some panic attacks and so we discussed having low-dose Ativan available for panic attacks and for sleep as this is also been problematic.  She also admits to having issues with dry mouth and eyes for which I have assessed her Sjogren's labs for in the past.  Now she is waking in the morning with stiffness in her joints which lasts more than an hour.  It is most pronounced in her hands.  She also tells me that she had 3 occasions of Cogan's's dystrophy.  I looked this up and it appears to be autoimmune and so we discussed that she really needs to see a rheumatologist as well.    OBJECTIVE: BP 94/60 (Patient Site: Right Arm, Patient Position: Sitting, Cuff Size: Adult Regular)   Pulse 68   LMP 04/20/2019 (Exact Date)   Breastfeeding No   General: Fit appearing middle-aged female who is also looking fatigued/stressed  HEENT: Eyes are reddened and she is tearful  Heart: Regular rate and rhythm  Lungs: Clear  Psych: She is in better control of herself  today than she was when I last saw her.  She is still anxious and tearful and frustrated.    ASSESSMENT & PLAN:     1. Menopausal syndrome (hot flashes)  She prefers to go on birth control as this will be free of cost compared to hormone replacement therapy.  - levonorgestrel-ethinyl estradiol (ENPRESSE) 50-30 (6)/75-40 (5)/125-30(10) per tablet; Take 1 tablet by mouth daily.  Dispense: 84 tablet; Refill: 3    2. Anxiety  Continuing unabated.    3. Panic attack  Needs to have treatment available and so I will give her a small amount of low-dose lorazepam.  - LORazepam (ATIVAN) 0.5 MG tablet; Take 1 tablet (0.5 mg total) by mouth every 8 (eight) hours as needed for anxiety (panic attacks, insomnia).  Dispense: 30 tablet; Refill: 0    4. Psychophysiological insomnia  Needs to improve her sleep for better emotional stability and so we discussed treatment.  - LORazepam (ATIVAN) 0.5 MG tablet; Take 1 tablet (0.5 mg total) by mouth every 8 (eight) hours as needed for anxiety (panic attacks, insomnia).  Dispense: 30 tablet; Refill: 0    5. Pain in both hands  Along with the stiffness in her body as well, and history of dry mouth and eyes, I believe she has autoimmune issues happening perhaps.  She needs evaluation and is agreeable.    6. Dry eyes    7. Dry mouth    8. Dystrophia, cornea, Cogan's   - Ambulatory referral to Rheumatology  - predniSONE 2 mg TbEC; Take 2 mg by mouth daily.  Dispense: 30 tablet; Refill: 0    I will treat her with low-dose prednisone to control her symptoms until she can get scheduled with rheumatology which historically is taking a long time.  She will let me know if this helps.  Treating her menopausal symptoms may help her mood as well as her achiness if I am neptali.  I will also give her the Ativan which she is to use very sparingly and only with extreme anxiety/panic or for sleep.  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  She is to return to see  me in no longer than 3 months, sooner if needed.  40 minutes spent together with the patient today, more than 50% spent in counseling, discussing the above topics.        Patient Active Problem List   Diagnosis     Bipolar Disorder     Fatigue     Tinea Versicolor     Fainting (Syncope)     Menopausal Disorder     Cerv Pap Smear (+) Atyp Glandular Cells Undetermined Signif     Recurrent cold sores     Surveillance of previously prescribed contraceptive pill     History of HPV infection     Pap smear of cervix with ASCUS, cannot exclude HGSIL     History of squamous cell carcinoma     Dystrophia, cornea, Cogan's       Current Outpatient Medications on File Prior to Visit   Medication Sig Dispense Refill     buPROPion (WELLBUTRIN XL) 150 MG 24 hr tablet TAKE 3 TABLETS BEDTIME       cholecalciferol, vitamin D3, 5,000 unit Tab Take 1 tablet by mouth daily as needed.              FLUoxetine (PROZAC) 20 MG capsule TAKE 3 CAPSULE DAILY       QUEtiapine (SEROQUEL) 100 MG tablet Take 300 mg by mouth at bedtime.              QUEtiapine (SEROQUEL) 50 MG tablet Take one tablet by mouth as needed for anxiety.       topiramate (TOPAMAX) 100 MG tablet TAKE 3 TABLET BEDTIME       valACYclovir (VALTREX) 1000 MG tablet Two tablets twice a day for one day for recurrent cold sores. (Patient taking differently: Two tablets twice a day for one day for recurrent cold sores as needed.      ) 20 tablet 3     No current facility-administered medications on file prior to visit.

## 2021-06-04 NOTE — TELEPHONE ENCOUNTER
Medication Question or Clarification  Who is calling: Pharmacy: [facility]  What medication are you calling about? (include dose and sig)    Disp Refills Start End    predniSONE 2 mg TbEC 30 tablet 0 12/20/2019     Sig - Route: Take 2 mg by mouth daily. - Oral    Sent to pharmacy as: predniSONE 2 mg tablet,delayed release    E-Prescribing Status: Receipt confirmed by pharmacy (12/20/2019  1:46 PM CST)        Who prescribed the medication?: Marychuy Marion MD   What is your question/concern?: Brand is not cover and cost $2,000 was wondering if there is an alternative for patient to take instread.   Pharmacy: [id number]  Okay to leave a detailed message?: Yes  [phone]  Site CMT - Please call the pharmacy to obtain any additional needed information.   [id number] [facility]

## 2021-06-05 VITALS
WEIGHT: 117 LBS | BODY MASS INDEX: 20.73 KG/M2 | HEART RATE: 70 BPM | SYSTOLIC BLOOD PRESSURE: 118 MMHG | DIASTOLIC BLOOD PRESSURE: 62 MMHG | HEIGHT: 63 IN | OXYGEN SATURATION: 97 %

## 2021-06-05 NOTE — TELEPHONE ENCOUNTER
BK    Yes, No? Refer out?    Auth Provider: MARYCHUY MARION Enc Provider: Marychuy Marion MD   Diagnosis: Thyroid disease

## 2021-06-05 NOTE — TELEPHONE ENCOUNTER
Left message to call back for: pt  Information to relay to patient:  [facility] paperwork completed by dr. Marion. Copy made for chart. [place] she like to p/u original? Placed at the  for .

## 2021-06-05 NOTE — TELEPHONE ENCOUNTER
Who is calling:  Catherine  Reason for Call:  Patient was seen by neurologist at [facility].  [facility] will be faxing these results to Dr. Marion.  Patient would like to speak to Dr. Marion after she reviews the abnormal labs.  Patient did not know exactly what the labs were other than her calcium level is high, her kidney function is poor and thyroid is off.  Date of last appointment with primary care: 12/20/19  Okay to leave a detailed message: Yes

## 2021-06-05 NOTE — PROGRESS NOTES
SUBJECTIVE: Catherine Marte is a 54 y.o. White or  female who presents today for follow up from a consult with Neurology.  She was referred to Neurology by her psychiatrist for cognitive changes.  At her consult she scored 27/30 on her MMSE having difficulty with recall and clock drawing.  Her cognitive issues are thought to be related to psychiatric pathology and she underwent neuropsychometric testing.  She is scheduled to receive the results of that testing later this week.  She also had some lab work that was abnormal and she is here today mainly to discuss that.  On 1/6/2020 her TSH was 5.85 which is an increase from 3.11 on 11/12/19 and total T4 was 2.8.  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  During the time period between Thanksgiving and Christmas she was the only nurse working and saw 50-75 kids daily.  She felt completely unsupported by the  and felt she had no choice but to take some time off.  Patient is currently on a 6 week leave of absence from her psychiatrist Dr. Kelly.  She tearfully describes an unpleasant exchange with Dr. Kelly last week where she felt she was being accused of trying to get on total disability when this is not her intention.  She is due to return to work in mid February.  Patient states she has been trying to keep her mind busy buy doing [employer] puzzles and jewelry making.  She is in the process of being evaluated for joint arthropathy.  She complains of fatigue and that her hands are stiff and painful every morning and intermittently throughout the day.  She states she has two fingers that will catch and pop.  She states she also has bilateral shoulder and hip pain.  She complains of her arms falling asleep.  She is normally very active working out at [employer] 3-4 times per week but she has not worked out in over a week.  She is planning to work out tonight.  I did give her a  prescription for low dose prednisone at her last appointment and she states she doesn't think this was helpful.  She has had one panic attack in which she started shaking and could not stop.  She does have a prescription lorazepam 0.5 mg to be taken PRN up to 3 times daily.  She states she has about half the bottle left.  Patient states she is getting frustrated with all of these symptoms she is having and doesn't know what is wrong with her.  She states she never had an ache or a pain until about a year ago.  Her  blames this on her working out at [employer] as some of her issues started around this same time.             OBJECTIVE: /70 (Patient Site: Left Arm, Patient Position: Sitting, Cuff Size: Adult Regular)   Pulse 64   Wt 116 lb 9.6 oz (52.9 kg)   LMP 04/20/2019 (Exact Date)   BMI 20.33 kg/m    General: Healthy female appearing younger than stated age   Heart: Regular rate and rhythm, no murmur  Lungs: Clear bilaterally, regular and unlabored respiratory effort  Extremities: Warm, dry, no edema. Some mildly larger finger joints.No warmth or redness.  Psych:Anxious but more in control,less teary    ASSESSMENT & PLAN:     1. Arthropathy of shoulder region  - continue with Rheumatology appointment    2. Pain of both hip joints  - continue with Rheumatology appointment    3. Pain in both hands  - Continue with Rheumatology appointment    4. Chronic fatigue  - Thyroid Stimulating Hormone (TSH)  - T4, Free  - T4, Total  - T3, Total  - Will check the above mentioned labs.  A referral to Endocrinology may be in the future.     5. Elevated serum creatinine  - Basic Metabolic Panel  - Patient states she did not stay adequately hydrated while she was working. Will recheck a basic metabolic profile to evaluated previously elevated creatinine.     Will check the above mentioned labs and notify patient of results via My Chart, will call with grossly abnormal results only.  Patient will bring in [facility]  paperwork to be completed for her SNUNI.     40 minutes spent together with the patient today, more than 50% spent in counseling, discussing the above topics.    Addendum: Thyroid labs came back abnormal.  Normal TSH, low T4 free.  Referral to endocrinology given.    Patient Active Problem List   Diagnosis     Bipolar Disorder     Fatigue     Tinea Versicolor     Fainting (Syncope)     Menopausal Disorder     Cerv Pap Smear (+) Atyp Glandular Cells Undetermined Signif     Recurrent cold sores     Surveillance of previously prescribed contraceptive pill     History of HPV infection     Pap smear of cervix with ASCUS, cannot exclude HGSIL     History of squamous cell carcinoma     Dystrophia, cornea, Cogan's       Current Outpatient Medications on File Prior to Visit   Medication Sig Dispense Refill     buPROPion (WELLBUTRIN XL) 150 MG 24 hr tablet TAKE 3 TABLETS BEDTIME       cholecalciferol, vitamin D3, 5,000 unit Tab Take 1 tablet by mouth daily as needed.              FLUoxetine (PROZAC) 20 MG capsule TAKE 3 CAPSULE DAILY       LORazepam (ATIVAN) 0.5 MG tablet Take 1 tablet (0.5 mg total) by mouth every 8 (eight) hours as needed for anxiety (panic attacks, insomnia). 30 tablet 0     QUEtiapine (SEROQUEL) 100 MG tablet Take 300 mg by mouth at bedtime.              QUEtiapine (SEROQUEL) 50 MG tablet Take one tablet by mouth as needed for anxiety.       topiramate (TOPAMAX) 100 MG tablet Take 200 mg by mouth at bedtime.        valACYclovir (VALTREX) 1000 MG tablet Two tablets twice a day for one day for recurrent cold sores. (Patient taking differently: Two tablets twice a day for one day for recurrent cold sores as needed.      ) 20 tablet 3     [DISCONTINUED] levonorgestrel-ethinyl estradiol (ENPRESSE) 50-30 (6)/75-40 (5)/125-30(10) per tablet Take 1 tablet by mouth daily. 84 tablet 3     [DISCONTINUED] predniSONE (DELTASONE) 1 MG tablet Take 2 mg by mouth daily. 60 tablet 0     No current facility-administered  medications on file prior to visit.

## 2021-06-05 NOTE — TELEPHONE ENCOUNTER
Paperwork was filled out, faxed, scanned in copy was put up front for the patient who picked it up already.

## 2021-06-05 NOTE — TELEPHONE ENCOUNTER
Please notify the patient:    Lab work results have come back from [facility].  Patient's calcium score is minimally high and not worrisome, however it should be checked in no longer than 6 months to make sure there is not a trend upwards.  Patient's TSH is minimally elevated which means she could possibly be just a bit hypothyroid.  This I would also wait and check in 6 more months.  The kidney function is more worrisome, although frequently this can fluctuate and actually just be transient in nature.  This lab I would want her to recheck in 3 months.  If she would like me to write these orders so she can have the labs drawn here, I would be glad to do that.  Please get back to me on this.

## 2021-06-05 NOTE — TELEPHONE ENCOUNTER
Patient Returning Call  Reason for call:  [facility] paperwork  Information relayed to patient:  The writer read the following to patient per CMA : [facility] paperwork completed by dr. Marion. Copy made for shaunna. [place] she like to p/u original? Placed at the  for . Please leave at  and patient will pick it up. No need to fax.     Patient has additional questions:  No  If YES, what are your questions/concerns:  NA  Okay to leave a detailed message?: No call back needed
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
## 2021-06-06 NOTE — TELEPHONE ENCOUNTER
----- Message from Bong Cuevas DO sent at 2/24/2020  9:42 PM CST -----  Noted to have some signs of mild renal insufficiency (mild diminished kidney function), not new with current level slightly lower compared to prior levels.  Recommend avoid anti-inflammatory medications such as Advil, Aleve etc. Recheck creatinine level in about 6 weeks.    Otherwise remainder of lab results were within normal limits.

## 2021-06-06 NOTE — PROGRESS NOTES
Occupational Therapy Daily Progress     Patient Name: Catherine Marte  Date: 3/10/2020  Visit #: 3  Referral Diagnosis: pain in both hands  Referring provider: Bong Cuevas*  Visit Diagnosis:     ICD-10-CM    1. Pain in finger of both hands  M79.645     M79.644    2. Weakness of both hands  R29.898    3. Decreased activities of daily living (ADL)  Z78.9        Assessment:     Patient is appropriate to continue with skilled occupational therapy intervention, as indicated by initial plan of care.    Goal Status:  Patient Will Demonstrate / Verbalize independence in self-management of condition in: 4 weeks  Patient will be independent with home exercise program in: 4 weeks  Patient will be able to: lift;carry;for housework;for grocery shopping;with less pain;in 12 weeks  Patient will be able to  & pinch: for meal prep;with less pain;in 12 weeks  Patient will improve hand/finger coordination for: fasteners;with less pain;in 12 weeks  Pt will: be able to perform leisure task (making jewelry or card making) for 2 hours without pain in 12 weeks      Plan / Patient Education:     Continue with initial plan of care.  Progress with home program as tolerated.    Subjective:     Pain rating at rest: 0  Pain rating with activity: 8      Objective:     Plan for next visit: fabricate right thumb spica of some sort. Will discuss with patient.    Treatment Today: Patient instructed on isometric wrist strengthening with a 3# weight. She was fitted with X-span finger compression sleeves to wear at night to see if pain improves. The paraffin was helpful in decreasing pain for about 6 hours. Patient got a paraffin machine for home use and instructed on how to use this. Reviewed HEP  TREATMENT MINUTES COMMENTS   Evaluation     Self-care/ Home management 6 Review HEP   Manual therapy 10 Finger sleeves for edema mgmt   Neuromuscular Re-education     Therapeutic Exercises 8 Wrist sttrengthening   Paraffin     Orthotic Fitting      Total 24    Blank areas are intentional and mean the treatment did not include these items.       Juany Noonan  3/10/2020  3:34 PM

## 2021-06-06 NOTE — PROGRESS NOTES
SUBJECTIVE: Catherine Marte is a 54 y.o. White or  female who presents today to discuss her issues with her disability claim as she is concerned that although I put her off until the end of the school year for severe anxiety as was suggested by her neuropsych evaluation, she thinks perhaps her psychiatrist Dr. Kelly may not go along with this.  Last time she was seen by Dr. Kelly, she gave her the idea that she would not be willing to condone the disability.  I believe this is why she sent her for the referral to neuropsych.  Her therapist Joslyn Huff had recommended it first.  I wrote out the paperwork as such because of these recommendations.  She has a upcoming appointment with Dr. Kelly.  We discussed whether or not her  should be going to her appointment with her.  He wrote a note to Dr. Kelly which the patient hands me to read.  He is suggesting that she would be putting patient safety in jeopardy if she were to return to work.  She is weary about this because a previous coworker was basically laid off or fired because of having depression and him questioning whether he needed to be having his work double checked because he had omitted adding 1 page of an evaluation for a child and got an incorrect score.  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  She does not want to be fired or let go.  We discussed that her situation is temporary and it should not jeopardize her being able to safely go back to work in September.  It just will require a number of weeks in order to control the issue by management of medication.  She is trying to keep herself busy by doing art work etc in the meantime.  She also is here to have a Pap smear done because she is behind on doing so.  Back in 2014 she had her first abnormal Pap with a ASCUS diagnosis but no high risk HPV was seen.  Then in 2015 she had a negative Pap with high risk HPV.  The following year she had an ASCUS cannot  "rule out HGSIL and so I sent her to [facility] where she saw Dr. Alvarez who did a colposcopy with endocervical biopsy.  The pathology on that thought it was likely low-grade SIMON.  However that was January 2017 and she really is behind on having that rechecked.      OBJECTIVE: BP 98/56 (Patient Site: Right Arm, Patient Position: Sitting, Cuff Size: Adult Regular)   Pulse 64   Ht 5' 3.5\" (1.613 m)   Wt 114 lb 14.4 oz (52.1 kg)   LMP 04/20/2019 (Exact Date)   Breastfeeding No   BMI 20.03 kg/m    General: Healthy appearing thin middle-aged female who appears anxious  Heart: Regular rate and rhythm without murmur  Lungs: Clear bilaterally  Genitalia: External genitalia appear normal, no lesions are appreciated.  Spectrum exam shows cervix which looks pretty normal and Pap sampling was taken with HPV lab.      ASSESSMENT & PLAN:     1. Pap smear of cervix with ASCUS, cannot exclude HGSIL  I will get back to her on this result by my chart if it is normal and by phone if it is not.  - Gynecologic Cytology (PAP Smear)  - HPV High Risk DNA Cervical    2. Panic attack  I refilled her low-dose lorazepam 0.5 mg for 30 tabs today.  - LORazepam (ATIVAN) 0.5 MG tablet; Take 1 tablet (0.5 mg total) by mouth every 8 (eight) hours as needed for anxiety (panic attacks, insomnia).  Dispense: 30 tablet; Refill: 0    3. Psychophysiological insomnia  This has improved somewhat because she is on disability and off of work.  - LORazepam (ATIVAN) 0.5 MG tablet; Take 1 tablet (0.5 mg total) by mouth every 8 (eight) hours as needed for anxiety (panic attacks, insomnia).  Dispense: 30 tablet; Refill: 0    4. Bipolar Disorder  She will see Dr. Kelly this week still and hopefully Dr. Kelly will go along with our recommendation of being off of school until September of this year.    She should see me back in a couple months time or sooner on an as-needed basis.    Patient Active Problem List   Diagnosis     Bipolar Disorder     " Fatigue     Tinea Versicolor     Fainting (Syncope)     Menopausal Disorder     Recurrent cold sores     Surveillance of previously prescribed contraceptive pill     History of HPV infection     Pap smear of cervix with ASCUS, cannot exclude HGSIL     History of squamous cell carcinoma     Dystrophia, cornea, Cogan's       Current Outpatient Medications on File Prior to Visit   Medication Sig Dispense Refill     buPROPion (WELLBUTRIN XL) 150 MG 24 hr tablet TAKE 3 TABLETS BEDTIME       cholecalciferol, vitamin D3, 5,000 unit Tab Take 1 tablet by mouth daily as needed.              FLUoxetine (PROZAC) 20 MG capsule TAKE 3 CAPSULE DAILY       QUEtiapine (SEROQUEL) 100 MG tablet Take 300 mg by mouth at bedtime.              QUEtiapine (SEROQUEL) 50 MG tablet Take one tablet by mouth as needed for anxiety.       topiramate (TOPAMAX) 100 MG tablet Take 200 mg by mouth at bedtime.        valACYclovir (VALTREX) 1000 MG tablet Two tablets twice a day for one day for recurrent cold sores. (Patient taking differently: Two tablets twice a day for one day for recurrent cold sores as needed.      ) 20 tablet 3     No current facility-administered medications on file prior to visit.

## 2021-06-06 NOTE — PROGRESS NOTES
Occupational Therapy Daily Progress     Patient Name: Catherine Marte  Date: 2/28/2020  Visit #: 2  Referral Diagnosis: pain in both hands  Referring provider: Bong Cuevas*  Visit Diagnosis:     ICD-10-CM    1. Pain in finger of both hands M79.645     M79.644    2. Weakness of both hands R29.898    3. Decreased activities of daily living (ADL) Z78.9        Assessment:     Patient is appropriate to continue with skilled occupational therapy intervention, as indicated by initial plan of care.    Goal Status:  Patient Will Demonstrate / Verbalize independence in self-management of condition in: 4 weeks  Patient will be independent with home exercise program in: 4 weeks  Patient will be able to: lift;carry;for housework;for grocery shopping;with less pain;in 12 weeks  Patient will be able to  & pinch: for meal prep;with less pain;in 12 weeks  Patient will improve hand/finger coordination for: fasteners;with less pain;in 12 weeks  Pt will: be able to perform leisure task (making jewelry or card making) for 2 hours without pain in 12 weeks      Plan / Patient Education:     Continue with initial plan of care.  Progress with home program as tolerated.    Subjective:     Pain rating at rest: 0  Pain rating with activity: 8      Objective:     Plan for next visit: strengthening for wrists    Treatment Today: Paraffin to bilateral hands today. Patient instructed on use of this at home.  TREATMENT MINUTES COMMENTS   Evaluation     Self-care/ Home management     Manual therapy     Neuromuscular Re-education     Therapeutic Exercises     Paraffin 24    Orthotic Fitting     Total 24    Blank areas are intentional and mean the treatment did not include these items.       Juany Noonan  2/28/2020  9:34 AM

## 2021-06-06 NOTE — PATIENT INSTRUCTIONS - HE
Summary of Your Rheumatology Visit    Next Appointment:  3 Months    Medications:    Please follow directives on pill bottle on how to take medication(s) provided.    Recommend trying Tylenol 500-1000 mg twice a day if necessary for pain relief.    Limit use of Ibuprofen, preferably hold given diminished kidney function.    Referrals:    Neurology    Tests:     Please have labs and x-rays that were ordered performed.        Injections:    As discussed, if necessary you can contact us and schedule an injection visit for pains involving the following regions: Right fourth digit trigger finger, left trochanteric bursa/lateral hip.      Other:    Suggest looking into obtaining a paraffin wax machine for hand pains.

## 2021-06-06 NOTE — PROGRESS NOTES
Upper Extremity Initial Evaluation    Patient Name: Catherine Marte  Date of evaluation: 2/27/2020  Referral Diagnosis: pain in both hands  Referring provider: Bong Cuevas*  Visit Diagnosis:     ICD-10-CM    1. Pain in finger of both hands M79.645     M79.644    2. Weakness of both hands R29.898    3. Decreased activities of daily living (ADL) Z78.9        Assessment:      Pt. is appropriate for skilled OT intervention as outlined in the Plan of Care (POC). Patient is working with a rheumatologist and neurologist and will be following up with both.    Goals:  Patient Will Demonstrate / Verbalize independence in self-management of condition in: 4 weeks  Patient will be independent with home exercise program in: 4 weeks  Patient will be able to: lift;carry;for housework;for grocery shopping;with less pain;in 12 weeks  Patient will be able to  & pinch: for meal prep;with less pain;in 12 weeks  Patient will improve hand/finger coordination for: fasteners;with less pain;in 12 weeks  Pt will: be able to perform leisure task (making jewelry or card making) for 2 hours without pain in 12 weeks      Patient's expectations/goals are realistic.    Barriers to Learning or Achieving Goals:  No Barriers.       Plan / Patient Instructions:        Plan of Care:   Communication with: Referral Source  Patient Related Instruction: Nature of Condition;Treatment plan and rationale;Basis of treatment;Expected outcome  Times per Week: 1  Number of Weeks: 12  Number of Visits: 12  Therapeutic Exercise: Strengthening;Stretching  Neuromuscular Reeducation: kinesio tape  Manual Therapy: soft tissue mobilization  Modalities: paraffin  Functional Training (ADL's): ADL's;compensatory training;ergonomics;meal prep  Orthotic Fitting: OTC;custom         Subjective:        Social information:    Occupation: Nurse   Work Status:Working full time but on medical SUNNI for her current job as a result of current symptoms     History of  Present Illness:    Catherine is a 54 y.o. female who presents to therapy today with complaints of bilateral UE tingling, pain and weakness. Date of onset/duration of symptoms is November 2019. Onset was sudden. Symptoms are not improving. She reports no history of similar symptoms. She describes their previous level of function as not limited. Functional limitations are described as occurring with gripping, pinching, lifting, carrying for ADL's and IADL's.     Pain rating at rest: 0  Pain rating with activity: 8         Objective:      Note: Items left blank indicates the item was not performed or not indicated at the time of the evaluation.     Patient Outcome Measures: QuickDASH Score: 40.9      Upper Extremity Examination:  1. Pain in finger of both hands     2. Weakness of both hands     3. Decreased activities of daily living (ADL)       Precautions/Restrictions: None  Involved side: Bilateral  Atrophy:  Absent  Color: Normal  Temperature: Normal  Edema: Minimal  Palpation: No tenderness.   Scar: NA  Guarded extremity: Minimal.  Sensory: tingling in both arms up to her shoulder with right worse than the left      Hand AROM  Right   Left     NT WNL Impaired NT WNL Impaired   Full Fist  x   x    Flat Fist  x   x    Claw Fist  x   x      ROM/STRENGTH RIGHT LEFT   Note: * indicates pain AROM AROM   Shoulder Flexion - 180? WNL WNL   Shoulder Ext - 60?      Shoulder Abd - 180?     Elbow Flexion - 150? WNL WNL   Elbow Extension - 0?    WNL WNL   Forearm Supination - 80? WNL WNL   Forearm Pronation - 80? WNL WNL   Wrist Flexion - 65-80?  WNL WNL   Wrist Extension - 65-80? WNL WNL   Ulnar Deviation - 20-35?     Radial Deviation - 10-20?      Strength 45# 31#   3 Point Pinch 9# 7#   Lateral Pinch 13# 11#     May benefit from PT evaluation: No    U/E orthosis currently: No    Plan for next visit: paraffin    Treatment Today:  Patient fitted with right XS Bae compression glove to wear at night. She had it on in the  clinic today and had no color changes or pain after 20 minutes. She was instructed on precaution, wear and care of glove. Patient instructed on isometric thumb strengthening as well as  and pinch strength exercises.  TREATMENT MINUTES COMMENTS   Evaluation 30    Self-care/ Home management 8    Manual therapy     Neuromuscular Re-education     Orthotic fitting     Therapeutic Exercises 12    Iontophoresis           Total 50    Blank areas are intentional and mean the treatment did not include these items.     GOALS AND PLAN OF CARE WERE ESTABLISHED IN COOPERATION WITH THE PATIENT    OT Evaluation Code: (Please list factors)   Comorbidities:   Patient Active Problem List   Diagnosis     Bipolar Disorder     Fatigue     Tinea Versicolor     Fainting (Syncope)     Menopausal Disorder     Recurrent cold sores     Surveillance of previously prescribed contraceptive pill     History of HPV infection     Pap smear of cervix with ASCUS, cannot exclude HGSIL     History of squamous cell carcinoma     Dystrophia, cornea, Cogan's       Profile/History Review: Brief    Need for eval modification: No   # Treatment options: Limited    Clinical Decision Making:  Low      Occupational Profile/ Medical and Therapy History and Comorbidities Occupational Performance Clinical Decision Making   (Complexity)   brief history with review of medical/therapy records related to the presenting problem.  No comorbidities 1-3 Performance deficits that result in activity limitations and/or participation restrictions.    No Assessment Modification  Low complexity, which includes  problem-focused assessments, and consideration of a limited number of treatment options.      expanded review of medical/therapy records and additional review of physical, cognitive and psychosocial history.    May have comorbidities 3-5 Performance deficits that result in activity limitations and/or participation restrictions.    Minimal to moderate modification of  assessment Moderate complexity, which includes analysis of data from detailed assessments, and consideration of several treatment options.         Review of medical/therapy records and extensive additional review of physical, cognitive and psychosocial history.  Comorbidities affect occupational performance 5 or more Performance deficits that result in activity limitations and/or participation restrictions.    Significant modification of assessment High complexity, analysis of  Occupational profile and data,  Comprehensive assessments, multiple treatment options.            Juany Noonan  2/27/2020  9:36 AM

## 2021-06-07 NOTE — TELEPHONE ENCOUNTER
Last ov-2/17/20  Last labs- 3/17/20, 4/9/20    Future appt- 5/5/20    Please advise gabapentin refill- pt requesting increase in dose and qty

## 2021-06-07 NOTE — TELEPHONE ENCOUNTER
Patient dropped off paperwork previously discussed with Dr. Marion.    Paperwork is in Dr. Marion inbox.    5/4/2020

## 2021-06-07 NOTE — TELEPHONE ENCOUNTER
Please call the patient and let her know if she needs this right away.  I see that Dr. Kelly has already filled out this paperwork and faxed it to me, however, it is not scanned into her chart yet.  Can it wait until that comes through?

## 2021-06-07 NOTE — PROGRESS NOTES
"Catherine Marte is a 54 y.o. female who is being evaluated via a billable telephone visit.      The patient has been notified of following:     \"This telephone visit will be conducted via a call between you and your physician/provider. We have found that certain health care needs can be provided without the need for a physical exam.  This service lets us provide the care you need with a short phone conversation.  If a prescription is necessary we can send it directly to your pharmacy.  If lab work is needed we can place an order for that and you can then stop by our lab to have the test done at a later time.    Telephone visits are billed at different rates depending on your insurance coverage. During this emergency period, for some insurers they may be billed the same as an in-person visit.  Please reach out to your insurance provider with any questions.    If during the course of the call the physician/provider feels a telephone visit is not appropriate, you will not be charged for this service.\"    Patient has given verbal consent to a Telephone visit? Yes    Patient would like to receive their AVS by AVS Preference: Ej.    Additional provider notes:   This is a complicated patient in both physical and mental health aspects.  She calls because she wants to discuss her disability paperwork.  She has been off of work since Christmas due to acute stress reaction.  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  More and more has been expected of her.  She had a history of type II bipolar disorder for which she has been followed by Dr. Kelly for a long time.  When this started happening Dr. Kelly began to move around her medicines and referred her to the [facility] for neurological assessment and neuropsych testing.  She saw them on 1/23.  She then saw me a bit later to fill out some FMLA paperwork.  At that time she had been having a lot of bilateral hand pain but " also other achy joints and so I sent her to rheumatology which she saw in mid February.  She was also seen over time on a monthly basis at minimum with Dr. Kelly who had been monitoring her depression with PHQ 9 serial testing.  She was changing medications around and that she was taken off of her usual Prozac and started on 100 mg of Zoloft.  That was not helpful then she was switched over to paroxetine.  She was also being seen by a therapist, Joslyn Huff.  There she was monitored with serial CRISELDA 7 testing and was diagnosed with generalized anxiety disorder.  She had an ER visit March 13 at [facility].  She apparently had not been taking her medication for a while and then tried to take it all at once to make up for it.  They called her and accidental OD.  The last time I saw her was 2/11/2020 for an annual physical.  At that time I rechecked a Pap because she had had one that was abnormal previously.  The new Pap was completely normal.  I also did quite a few labs and she had very strange pattern of thyroid lab results.  So I referred her to endocrinology.  She was supposed to have that appointment recently but it was postponed due to the corona virus pandemic.  We discussed how this could have something to do with her achiness.  This is what her rheumatologist thought as well.  He did lab work first and most of her labs were normal.  We discussed that her creatinine rise was likely due to the fact that she is using a lot of NSAIDs to control her joint pain.  Her last set of labs has her creatinine down to 1.2 from a high of 1.5.  She has now been off of work since Christmas and it has been long enough that she could be on long-term disability.  Previously she had been written off of work by Dr. Kelly, first until April and then to fall of next year as of her phone visit of 3/24/2020.  We discussed that long-term disability forms will need to be filled out by Dr. Kelly.  I can fill out anything needed  in addition to her paperwork.  She had previously considered retiring.  However, we discussed that might be to her benefit to do long-term disability instead.  I suggested talking to her union advocate and not just HR because the perspective might be a bit different.  I also told her she really needs to follow-up with endocrinology.  I stressed this as much as possible.    Assessment/Plan:  1. Acute reaction to stress  Due to increased pressures of work including workload and intensity of student needs.  Not sure this could be considered acute anymore.    2. Bipolar 2 disorder (H)  Long-term problem made worse by #1.  Currently in depression.  Has longtime psychiatrist who is adjusting medications.    3. Generalized anxiety disorder  Diagnosed by therapist whom she is seen regularly.    4. Reactive depression  This has reactivated after being previously controlled.    5. Disorder of thyroid  Patient has had abnormal thyroid results which are in a pattern that I cannot decipher.  Referral have been done to endocrinology and we are waiting for her to be able to be seen.    6. Arthritis/arthropathy of multiple joints  Has seen the rheumatologist.  Very possible that her joint problems are secondary to a thyroid issue.    7. Chronic kidney disease, stage III (moderate) (H)  This is secondary to overuse of NSAIDs and has been improving over time.  Needs to be continued to be monitored.    At this point I do not think she needs to come in for any lab work.  I think she needs to have her psychiatrist fill out disability paperwork because it is for her psych issues that she is on disability.  I will do whatever I can to help her.  I am suggesting she see her union advocate to make long-term decision planning.  She should follow-up with me for a med check/recheck in about August.  At that time we will repeat labs and hopefully she can be seen in real life.    Phone call duration: Excess of 45 minutes    Bernarda Jordan

## 2021-06-07 NOTE — TELEPHONE ENCOUNTER
Paperwork completed by DR Marion and faxed. Pt notified.    [employer] paperwork faxed.  Fax # [phone]     [employer] Fax # [phone]
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
## 2021-06-07 NOTE — PROGRESS NOTES
Discharge Summary  Patient Name: Catherine Marte  Date: 8/13/2020  Referral Diagnosis: pain in both hands  Referring provider: Marychuy Marion, *  Visit Diagnosis:   1. Pain in finger of both hands     2. Weakness of both hands     3. Decreased activities of daily living (ADL)         Goal status: goals met    Patient was seen for 4 visits between 2- and 4-.    Therapy will be discontinued at this time.  The patient will need a new referral to resume.    Thank you for your referral.  Juany Noonan  8/13/2020   5:04 PM    Occupational Therapy Daily Progress     Patient Name: Catherine Marte  Date: 4/30/2020  Visit #: 4  Referral Diagnosis: pain in both hands  Referring provider: Marychuy Marion, *  Visit Diagnosis:     ICD-10-CM    1. Pain in finger of both hands  M79.645     M79.644    2. Weakness of both hands  R29.898    3. Decreased activities of daily living (ADL)  Z78.9        Assessment:     Patient reports significant improvement in pain. Patient to return if she needs a left thumb spica orthosis. Otherwise she will continue HEP on her own.    Goal Status: progressing toward  Patient Will Demonstrate / Verbalize independence in self-management of condition in: 4 weeks  Patient will be independent with home exercise program in: 4 weeks  Patient will be able to: lift;carry;for housework;for grocery shopping;with less pain;in 12 weeks  Patient will be able to  & pinch: for meal prep;with less pain;in 12 weeks  Patient will improve hand/finger coordination for: fasteners;with less pain;in 12 weeks  Pt will: be able to perform leisure task (making jewelry or card making) for 2 hours without pain in 12 weeks      Plan / Patient Education:     Patient to return if she needs a left thumb spica orthosis. Otherwise she will continue HEP on her own.    Subjective:     Pain rating at rest: 2  Pain rating with activity: 4      Objective:       Treatment Today: Patient reports that the  isometric wrist strengthening (with a 3# weight) is going well. Patient will continue to try using the X-span finger compression sleeves . Patient bought a paraffin machine and had to return it because it was too hot. Fabricated right thumb spica today and instructed patient on wear and care. She will wear at night only.   TREATMENT MINUTES COMMENTS   Evaluation     Self-care/ Home management 10    Manual therapy     Neuromuscular Re-education     Therapeutic Exercises     Paraffin     Orthotic Fitting 35    Total 45    Blank areas are intentional and mean the treatment did not include these items.       Juany Noonan  4/30/2020  12:03 PM

## 2021-06-07 NOTE — TELEPHONE ENCOUNTER
Okay to increase dose of Neurontin to 300 mg twice daily-3 times daily, 90 tabs with 0 refills.  Recommend maintaining upcoming appointment next week for reassessment prior to long-term refills.

## 2021-06-07 NOTE — TELEPHONE ENCOUNTER
Patient Returning Call  Reason for call:  Return call.  Information relayed to patient:  Patient was informed of Marychuy Marion MD's below  Patient has additional questions:  No  If YES, what are your questions/concerns:  n/a  Okay to leave a detailed message?: No    Patient stated she would like Marychuy Marion MD to complete the form as soon as possible. Patient stated that Dr. Kelly's office either faxed or mailed their copy of the form to Marychuy Marion MD. Patient stated to let Marychuy Marion MD know that if Marychuy Marion MD cannot locate the form then the patient has a copy as well.

## 2021-06-07 NOTE — PATIENT INSTRUCTIONS - HE
Summary of Your Rheumatology Visit    Next Appointment:   4 Months    Medications:     Please follow directives on pill bottle on how to take medication(s) provided.    Referrals:       Tests:         Injections:         Other:

## 2021-06-10 NOTE — TELEPHONE ENCOUNTER
Date: 9/1/2020 Status: [facility]   Time: 9:00 AM Length: 15   Visit Type: LAB [[id number]] Copay: $0.00   Provider: [facility] Department: [facility]   Referring Provider: SUKHWINDER LISA CSN: [id number]   Notes: labs per dr salter

## 2021-06-10 NOTE — TELEPHONE ENCOUNTER
Please call to schedule f/u and labs in aug/sept with Dr Cuevas    Refilled per Rheum RN refill protocol
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
## 2021-06-10 NOTE — TELEPHONE ENCOUNTER
Date: 9/8/2020 Status: [facility]   Time: 3:30 PM Length: 30   Visit Type: TELEPHONE VISIT RETURN [[id number]] Copay: $0.00   Provider: Bong Cuevas DO

## 2021-06-10 NOTE — PROGRESS NOTES
"Catherine Marte is a 55 y.o. female who is being evaluated via a billable video visit.      The patient has been notified of following:     \"This video visit will be conducted via a call between you and your physician/provider. We have found that certain health care needs can be provided without the need for an in-person physical exam.  This service lets us provide the care you need with a video conversation.  If a prescription is necessary we can send it directly to your pharmacy.  If lab work is needed we can place an order for that and you can then stop by our lab to have the test done at a later time.    Video visits are billed at different rates depending on your insurance coverage. Please reach out to your insurance provider with any questions.    If during the course of the call the physician/provider feels a video visit is not appropriate, you will not be charged for this service.\"    Patient has given verbal consent to a Video visit? Yes  How would you like to obtain your AVS? AVS Preference: MyChart.  If dropped by the video visit, the video invitation should be sent to: Text to cell phone:  Will anyone else be joining your video visit? No        Video Start Time: 8:27 AM      SUBJECTIVE: Catherine Marte is a 55 y.o. White or  female who presents today by video to discuss her current disability situation.  She has had a history of bipolar disorder and eating disorder and has seen Dr. Kelly in the [facility] system for a very long time.  She tells me that she \"has had 13 med changes\" in the last few months.  Some of the changes have been new medications others have been dose changes and she has been in a funk for quite some time.  She has had 2 being on disability because she could not handle the stressors of her job.  She is currently on disability through her work.  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  She is not going to be able to go back in the fall.  Recently she had an " outpatient hospitalization where she did video resume EBT for 3 weeks every day from 9-3 30.  She is not sure how helpful this was.  She has a therapist named Natasha Prajapati which she is seen at least monthly as well.  It appears she is seeing Dr. Kelly monthly.  She needs a 1 page form filled out for her for her disability.  We went over her new med list.  Her current medications include Cymbalta which was added after tapering off of Lexapro.  She also has titrated up on Lamictal and is currently at 200 mg.  She decreased her topiramate a bit to 100 mg.  She is still taking 300 mg of Seroquel and 450 mg of bupropion XL.  She also takes gabapentin 900 mg at bedtime.  She is due to see the rheumatologist to I believe.  We discussed that she needs to get her labs drawn because her last creatinine was mildly elevated at 1.21 which was at least better from a previous creatinine of 1.4 back in March.  That is when she was having a lot of hand pain.  She is now watching the NSAID use.  I am hoping the Cymbalta might help with this.  She has managed to use only very minimal lorazepam.  And that the lorazepam is also at half milligram strength.  She continues on her hormone replacement therapy because when she tried to go off of it she got horrible hot flashes and that was not good for her mood either.  We discussed that she is due for a mammogram, in fact she is quite overdue.  She seems willing to do it.  I told her I put an order in for it along with her labs.  She can get her labs drawn here and we can fax anything she wants to Dr. Kelly but I bet that she can see it on care everywhere.  Her PHQ 9 today is 16.  Her CRISELDA 7 score is 12.  We discussed what she is doing to try to keep busy.  She says she takes care of her 89-year-old mother and brings her groceries etc.  She is doing her art work making cards and jewelry.  She is cooking and baking.  She is working out 4 days a week and meeting with a friend twice a  week.  She says she is busy enough.    OBJECTIVE: LMP 04/20/2019 (Exact Date)   General: Healthy appearing normal weight middle-aged female in no acute physical distress  HEENT: Extraocular movements and facial movements intact although her facial movements are flat and relatively non-expressive  Lungs: Speaking and breathing comfortably and without cough or wheeze  Extremities: Moving all 4 extremities without problem  Psych: Definitely her mood is low, her a affect is flat    ASSESSMENT & PLAN:     1. Bipolar Disorder  I read Dr. Kelly is note and she wanted to order a cholesterol lab and an A1c which are standard with the psych meds.  She has uncontrolled depression with a PHQ 9 score of 16.  She is getting both psychiatric and therapist held on a regular basis and continues to be on disability because she is not capable of working with her mood change.  - Lipid Blaine FASTING; Future  - Glycosylated Hemoglobin A1c; Future    2. Acute stress disorder  Stressors at work made this all worse and she was off of work before COVID ever hit event.    3. Chronic kidney disease, stage III (moderate) (H)  Likely secondary to NSAID use for osteoarthritis.  She is seeing the rheumatologist.  We will monitor this lab.  - Basic Metabolic Panel; Future    4. Hormone replacement therapy (HRT)  She thinks the hormones actually help her mood and they definitely help her hot flashes.    5. Menopausal Disorder  Hot flashes which are at bay when using hormone.    6. Encounter for screening mammogram for breast cancer  My records show her last mammogram was August 2015.  Patient promises to do her mammogram but I note that she promised this before and I had ordered a mammogram back in May 2019 and she never came in.  - Mammo Screening Bilateral; Future    7. Vitamin D deficiency  We should make sure that this is not abnormal as she needs all the help she can get with her mood  - Vitamin D, Total (25-Hydroxy); Future    I will have  my nurse call her and set up a lab only appointment.  She is to bring in her paperwork for me to fill out. She is to do her mammogram.  She can follow-up with me in no longer than 6 months time.    40 minutes spent together with the patient today, more than 50% spent in counseling, discussing the above topics.        Patient Active Problem List   Diagnosis     Bipolar Disorder     Tinea Versicolor     Menopausal Disorder     Recurrent cold sores     Surveillance of previously prescribed contraceptive pill     History of HPV infection     Pap smear of cervix with ASCUS, cannot exclude HGSIL     History of squamous cell carcinoma     Dystrophia, cornea, Cogan's     Disorder of thyroid     Arthritis/arthropathy of multiple joints     Chronic kidney disease, stage III (moderate) (H)     Hormone replacement therapy (HRT)       Current Outpatient Medications on File Prior to Visit   Medication Sig Dispense Refill     buPROPion (WELLBUTRIN XL) 150 MG 24 hr tablet TAKE 3 TABLETS BEDTIME       cholecalciferol, vitamin D3, 5,000 unit Tab Take 1 tablet by mouth daily as needed.              DULoxetine (CYMBALTA) 30 MG capsule TK ONE C PO  D X 1 WEEK THEN 2 CS D       escitalopram oxalate (LEXAPRO) 20 MG tablet TK 1/2 T PO D PLUS ZOLOFT 50MG FOR A WEEK. THEN TK 1 T QD AND STOP THE ZOLOFT       gabapentin (NEURONTIN) 300 MG capsule Take 2-3  tab po qam, prn. 270 capsule 0     levonorgestrel-ethinyl estradiol (ENPRESSE) 50-30 (6)/75-40 (5)/125-30(10) per tablet Take 1 tablet by mouth daily.       QUEtiapine (SEROQUEL) 300 MG tablet TK 1 T PO HS       topiramate (TOPAMAX) 100 MG tablet Take 200 mg by mouth at bedtime.        FLUoxetine (PROZAC) 20 MG capsule TAKE 2 CAPSULE DAILY       LORazepam (ATIVAN) 0.5 MG tablet Take 1 tablet (0.5 mg total) by mouth every 8 (eight) hours as needed for anxiety (panic attacks, insomnia). 30 tablet 0     paroxetine HCl (PAXIL ORAL) Take 50 mg by mouth daily.       QUEtiapine (SEROQUEL) 100 MG  tablet Take 300 mg by mouth at bedtime.              valACYclovir (VALTREX) 1000 MG tablet Two tablets twice a day for one day for recurrent cold sores. (Patient taking differently: Two tablets twice a day for one day for recurrent cold sores as needed.      ) 20 tablet 3     No current facility-administered medications on file prior to visit.        Video-Visit Details    Type of service:  Video Visit    Video End Time (time video stopped): 9:05 AM  Originating Location (pt. Location): Home    Distant Location (provider location):  [facility]/OB     Platform used for Video Visit: Jonathan Perrin(Shorty Marion MD

## 2021-06-12 NOTE — TELEPHONE ENCOUNTER
"Coronavirus (COVID-19) Notification    Caller Name (Patient, parent, daughter/sone, grandparent, etc)  Patient     Reason for call  Notify of Positive Coronavirus (COVID-19) lab results, assess symptoms,  review [facility] recommendations    Lab Result    Lab test:  2019-nCoV rRt-PCR or SARS-CoV-2 PCR    Oropharyngeal AND/OR nasopharyngeal swabs is POSITIVE for 2019-nCoV RNA/SARS-COV-2 PCR (COVID-19 virus)    RN Recommendations/Instructions per [facility] Coronavirus COVID-19 recommendations    Brief introduction script  Introduce self and then review script:  \"I am calling on behalf of [employer].  We were notified that your Coronavirus test (COVID-19) for was POSITIVE for the virus.  I have some information to relay to you but first I wanted to mention that the [place] Dept of Health will be contacting you shortly [it's possible MD already called Patient] to talk to you more about how you are feeling and other people you have had contact with who might now also have the virus.  Also, [facility] is Partnering with the [facility] for Covid-19 research, you may be contacted directly by research staff.\"    ssessment (Inquire about Patient's current symptoms)   Assessment   Current Symptoms at time of phone call: (if no symptoms, document No symptoms]  cough, headache,  No taste   Symptom onset (if applicable)  10/6/20     If at time of call, Patients symptoms hare worsened, the Patient should contact 911 or have someone drive them to Emergency Dept promptly:      If Patient calling 911, inform 911 personal that you have tested positive for the Coronavirus (COVID-19).  Place mask on and await 911 to arrive.    If Emergency Dept, If possible, please have another adult drive you to the Emergency Dept but you need to wear mask when in contact with other people.      Review information with Patient    Your result was positive. This means you have COVID-19 (coronavirus).  We have sent you a " letter that reviews the information that I'll be reviewing with you now.    How can I protect others?    If you have symptoms: stay home and away from others (self-isolate) until:    You've had no fever--and no medicine that reduces fever--for 1 full day (24 hours). And      Your other symptoms have gotten better. For example, your cough or breathing has improved. And     At least 10 days have passed since your symptoms started. (If you ve been told by a doctor that you have a weak immune system, wait 20 days.)     If you don't have symptoms: Stay home and away from others (self-isolate) until at least 10 days have passed since your first positive COVID-19 test. (Date test collected).    During this time:    Stay in your own room, including for meals. Use your own bathroom if you can.    Stay away from others in your home. No hugging, kissing or shaking hands. No visitors.     Don't go to work, school or anywhere else.     Clean  high touch  surfaces often (doorknobs, counters, handles, etc.). Use a household cleaning spray or wipes. You'll find a full list on the EPA website at www.epa.gov/pesticide-registration/list-n-disinfectants-use-against-sars-cov-2.     Cover your mouth and nose with a mask, tissue or other face covering to avoid spreading germs.    Wash your hands and face often with soap and water.    Caregivers in these groups are at risk for severe illness due to COVID-19:  o People 65 years and older  o People who live in a nursing home or long-term care facility  o People with chronic disease (lung, heart, cancer, diabetes, kidney, liver, immunologic)  o People who have a weakened immune system, including those who:  - Are in cancer treatment  - Take medicine that weakens the immune system, such as corticosteroids  - Had a bone marrow or organ transplant  - Have an immune deficiency  - Have poorly controlled HIV or AIDS  - Are obese (body mass index of 40 or higher)  - Smoke regularly    Caregivers  should wear gloves while washing dishes, handling laundry and cleaning bedrooms and bathrooms.    Wash and dry laundry with special caution. Don't shake dirty laundry, and use the warmest water setting you can.    If you have a weakened immune system, ask your doctor about other actions you should take.    For more tips, go to www.cdc.gov/coronavirus/2019-ncov/downloads/10Things.pdf.    You should not go back to work until you meet the guidelines above for ending your home isolation. You don't need to be retested for COVID-19 before going back to work--studies show that you won't spread the virus if it's been at least 10 days since your symptoms started (or 20 days, if you have a weak immune system).    Employers: This document serves as formal notice of your employee's medical guidelines for going back to work. They must meet the above guidelines before going back to work in person.    How can I take care of myself?    1. Get lots of rest. Drink extra fluids (unless a doctor has told you not to).    2. Take Tylenol (acetaminophen) for fever or pain. If you have liver or kidney problems, ask your family doctor if it's okay to take Tylenol.     Take either:     650 mg (two 325 mg pills) every 4 to 6 hours, or     1,000 mg (two 500 mg pills) every 8 hours as needed.     Note: Don't take more than 3,000 mg in one day. Acetaminophen is found in many medicines (both prescribed and over-the-counter medicines). Read all labels to be sure you don't take too much.    For children, check the Tylenol bottle for the right dose (based on their age or weight).    3. If you have other health problems (like cancer, heart failure, an organ transplant or severe kidney disease): Call your specialty clinic if you don't feel better in the next 2 days.    4. Know when to call 911: Emergency warning signs include:    Trouble breathing or shortness of breath    Pain or pressure in the chest that doesn't go away    Feeling confused like you  haven't felt before, or not being able to wake up    Bluish-colored lips or face    5. Sign up for [employer]. We know it's scary to hear that you have COVID-19. We want to track your symptoms to make sure you're okay over the next 2 weeks. Please look for an email from [employer]--this is a free, online program that we'll use to keep in touch. To sign up, follow the link in the email. Learn more at [email or web address].    Where can I get more information?    [facility]: [email or web address]    Coronavirus Basics: www.health.[email or web address].[place]./diseases/coronavirus/basics.html    What to Do If You're Sick: www.cdc.gov/coronavirus/2019-ncov/about/steps-when-sick.html    Ending Home Isolation: www.cdc.gov/coronavirus/2019-ncov/hcp/disposition-in-home-patients.html     Caring for Someone with COVID-19: www.cdc.gov/coronavirus/2019-ncov/if-you-are-sick/care-for-someone.html     [facility] clinical trials (COVID-19 research studies): [email or web address].[facility]-clinical-trials     A Positive COVID-19 letter will be sent via [employer] or the Mail.  (Exception, no letters sent to Presurgerical/Preprocedure Patients)    [Name]  Alan De Luna RN  [employer] [facility]  COVID19 Results Team RN  Ph# [phone]

## 2021-06-13 NOTE — TELEPHONE ENCOUNTER
RN cannot approve Refill Request    RN can NOT refill this medication historical medication requested. Last office visit: 1/21/2020 Marychuy Marion MD Last Physical: 2/11/2020 Last MTM visit: Visit date not found Last visit same specialty: 1/21/2020 Marychuy Marion MD.  Next visit within 3 mo: Visit date not found  Next physical within 3 mo: Visit date not found      Sariah Moseley, [facility] Triage/Med Refill 12/11/2020    Requested Prescriptions   Pending Prescriptions Disp Refills     levonorgestrel-ethinyl estradiol (ENPRESSE) 50-30 (6)/75-40 (5)/125-30(10) per tablet  0     Sig: Take 1 tablet by mouth daily.       Oral Contraceptives Protocol Passed - 12/10/2020 11:08 AM        Passed - Visit with PCP or prescribing provider visit in last 12 months      Last office visit with prescriber/PCP: 1/21/2020 Marychuy Marion MD OR same dept: 1/21/2020 Marychuy Marion MD OR same specialty: 1/21/2020 Marychuy Marion MD  Last physical: 2/11/2020 Last MTM visit: Visit date not found   Next visit within 3 mo: Visit date not found  Next physical within 3 mo: Visit date not found  Prescriber OR PCP: Marychuy Marion MD ([place])  Last diagnosis associated with med order: There are no diagnoses linked to this encounter.  If protocol passes may refill for 12 months if within 3 months of last provider visit (or a total of 15 months).                    Patient

## 2021-06-13 NOTE — TELEPHONE ENCOUNTER
Request from [facility] pharmacy to refill patient's levonorgestrel-ethinyl estradiol prescription.    12/10/2020

## 2021-06-15 PROBLEM — Z85.89 HISTORY OF SQUAMOUS CELL CARCINOMA: Status: ACTIVE | Noted: 2017-01-11

## 2021-06-15 NOTE — PROGRESS NOTES
SUBJECTIVE: Catherine Matre is a 55 y.o. White or  female who presents today for a med check.  She is a complicated patient.  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  She has bipolar disorder.  She has had depression and anxiety and chronic pain issues that have complicated her life.  She has been very distressed due to the stressors and anxieties of her job.  She sees the psychiatrist Dr. Sariah Kelly at [facility].  She has struggled quite a bit with changes that Dr. Kelly has done with her medications.  Back in August when I last saw her her PHQ-9 score was 16, today it is 14.  Her CRISELDA-7 score was 12 and today it is 9.  She still has some panic attacks however.  She has used Ativan in the past.  She tells me that when she was in [place] in January for a month, she did not take any Ativan with her because she thought she would not need it because of being on vacation.  However, she did indeed have panic attack.  She remembers getting very angry and then going into a crying fit.  She tells me that the most scary and the worst thing she has to deal with is issues with her memory.  Mostly this is short-term memory.  She is fearful because she has family history for Alzheimer's.  She tells me that she does not think she will ever be able to return to her job because of her memory loss.  She is scheduled to get a follow-up neuropsych test in March.  She has also had pain issues in her hands and neck particularly.  I had referred her to Dr. Cuevas whom she saw about a year ago.  He started her on Neurontin which she thought was actually helpful.  He also sent her for physical therapy and to see a neurologist.  He took x-rays of her hands and chest.  Unfortunately the pandemic hit and so she really did not follow-up with him or neurology as far as I know.  She continues to take duloxetine for her pain.  She asks me if I will refill her Neurontin as her prescription runs out because it has  "been a year.  She had a brace for her right hand which helped some of her pain.  Now the most difficult part of her hand pain is her left thumb.  She has used a lot of NSAIDs and has mild kidney disease which I assume is due to it or perhaps due to psych meds.  We are following that.  She also tells me she and her  had Covid in October.  She was more worried for him.  She is concerned because she is unemployed.  She can only get Cob[employer] for so long.  I believe her  is retired.  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  She does admit that it helps her to do this creative outlet.  In September 2020 she had an ultrasound and diagnostic mammogram done after a screening mammogram.  That was very stressful for her.  She also had her colonoscopy done around the same time but luckily that was good for 10 years.  I also had done a Pap about a year ago and it was normal and without HPV.  This was reassuring as she does have a history of atypical endometrial cells and atypical glandular cells back in 2016 and 2014. She was HPV positive in 2016.  She was seen for this by a specialist.  Her last labs were in August of last year.    OBJECTIVE: /62   Pulse 70   Ht 5' 3\" (1.6 m)   Wt 117 lb (53.1 kg)   LMP 01/01/2019 (Approximate)   SpO2 97%   Breastfeeding No   BMI 20.73 kg/m    General: Healthy appearing normal weight middle-aged female in no acute physical distress  Heart: Regular rate and rhythm without murmur  Lungs: Clear bilaterally  Abdomen: Soft  Extremities: Warm, dry and without edema  Psych: Tearful at times, nervous, anxious appearing    ASSESSMENT & PLAN:     1. Bipolar Disorder  Sees her psychiatrist and a therapist regularly.  Still having symptoms of depression and anxiety.  Sleep can be affected.  Still with some panic attacks.  Will do lab work.  - Comprehensive Metabolic Panel    2. Poor short term memory  Worsening with " time unfortunately.  Family history for Alzheimer's.    3. Stage 3b chronic kidney disease  Last creatinine was 1.32 with a GFR of 42.  Will monitor.    4. Hormone replacement therapy (HRT)  Patient has symptoms of poor sleep and hot flashes if she does not have hormone replacement.  - levonorgestrel-ethinyl estradiol (ENPRESSE) 50-30 (6)/75-40 (5)/125-30(10) per tablet; Take 1 tablet by mouth daily.  Dispense: 3 Package; Refill: 1    5. History of abnormal cervical Pap smear  This has been resolved I believe.  Last Pap a year ago was completely normal.    6. Paresthesia of upper extremity  Patient asks for a refill of the Neurontin.  Has not followed up with the rheumatologist.  - gabapentin (NEURONTIN) 300 MG capsule; Take 2-3  tab po qam, prn.  Dispense: 270 capsule; Refill: 0    7. Paresthesia of right lower extremity  - gabapentin (NEURONTIN) 300 MG capsule; Take 2-3  tab po qam, prn.  Dispense: 270 capsule; Refill: 0    8. Lipid screening  - Lipid [place] RANDOM    I will get back to her on her lab results by my chart and only call with grossly abnormal values.  She has had a lot of changes in her medication and so her med list was updated today.  I would like to see her back in no longer than 6 months time.    40+ minutes spent together with the patient today, more than 50% spent in counseling, discussing the above topics.        Patient Active Problem List   Diagnosis     Bipolar Disorder     Tinea Versicolor     Menopausal Disorder     Recurrent cold sores     Surveillance of previously prescribed contraceptive pill     History of HPV infection     Pap smear of cervix with ASCUS, cannot exclude HGSIL     History of squamous cell carcinoma     Dystrophia, cornea, Cogan's     Disorder of thyroid     Arthritis/arthropathy of multiple joints     Chronic kidney disease, stage III (moderate)     Hormone replacement therapy (HRT)     Poor short term memory       Current Outpatient Medications on File Prior to  Visit   Medication Sig Dispense Refill     buPROPion (WELLBUTRIN XL) 150 MG 24 hr tablet TAKE 3 TABLETS BEDTIME       cholecalciferol, vitamin D3, 5,000 unit Tab Take 1 tablet by mouth daily as needed.              DULoxetine (CYMBALTA) 30 MG capsule TK ONE C PO  D X 1 WEEK THEN 2 CS D       lamoTRIgine (LAMICTAL) 200 MG tablet Take 200 mg by mouth daily.       QUEtiapine (SEROQUEL) 300 MG tablet TK 1 T PO HS       topiramate (TOPAMAX) 100 MG tablet Take 200 mg by mouth at bedtime.        LORazepam (ATIVAN) 0.5 MG tablet Take 1 tablet (0.5 mg total) by mouth every 8 (eight) hours as needed for anxiety (panic attacks, insomnia). 30 tablet 0     valACYclovir (VALTREX) 1000 MG tablet Two tablets twice a day for one day for recurrent cold sores. (Patient taking differently: Two tablets twice a day for one day for recurrent cold sores as needed.      ) 20 tablet 3     No current facility-administered medications on file prior to visit.
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
## 2021-06-16 PROBLEM — M12.9 ARTHRITIS/ARTHROPATHY OF MULTIPLE JOINTS: Chronic | Status: ACTIVE | Noted: 2020-04-18

## 2021-06-16 PROBLEM — H18.529: Status: ACTIVE | Noted: 2019-12-20

## 2021-06-16 PROBLEM — N18.30 CHRONIC KIDNEY DISEASE, STAGE III (MODERATE) (H): Chronic | Status: ACTIVE | Noted: 2020-04-18

## 2021-06-16 PROBLEM — Z79.890 HORMONE REPLACEMENT THERAPY (HRT): Chronic | Status: ACTIVE | Noted: 2020-08-10

## 2021-06-16 PROBLEM — R41.3 POOR SHORT TERM MEMORY: Status: ACTIVE | Noted: 2021-02-28

## 2021-06-16 PROBLEM — E07.9 DISORDER OF THYROID: Status: ACTIVE | Noted: 2020-04-18

## 2021-06-16 NOTE — TELEPHONE ENCOUNTER
"Telephone Encounter by Isabela Michel LPN at 11/21/2019 11:50 AM     Author: Isabela Michel LPN Service: -- Author Type: Licensed Nurse    Filed: 11/21/2019 11:52 AM Encounter Date: 11/20/2019 Status: Signed    : Isabela Michel LPN (Licensed Nurse)       Patient Returning Call  Reason for call:  Message from clinic  Information relayed to patient:  Marychuy Marion MD   to Degree, Nisreen WRIGHT CMA          11:14 AM   Note      We discussed a multitude of things at our appt.  Have her make an appt with me in late Dec when I'm here and she is off work and we can go over risks and benefits of treatment.  Give her a 40 min appt please.        Patient has additional questions:  Yes  If YES, what are your questions/concerns:   \"So I am suppose to suffer with these severe hot flashes until late December??\"  Pt was displeased at this response, stating she will find a new provider.  Okay to leave a detailed message?: No call back needed       "

## 2021-06-20 NOTE — LETTER
Letter by Marychuy Marion MD at      Author: Marychuy Marion MD Service: -- Author Type: --    Filed:  Encounter Date: 3/2/2020 Status: (Other)       Catherine Marte  [address] [address]    March 2, 2020    Dear Catherine    In reviewing your records, we have determined a gap in your preventive services. Based on your age and health history, we recommend the follow service.       ? Mammogram        If you have had the service elsewhere, please contact us so we can update our records. Please let us know if you have transferred your care to another clinic.    Please call [phone] to schedule this appointment.    We believe that a strong preventive care program, including regular physicals and follow-up care is an important part of a healthy lifestyle and we are committed to helping you maintain your health.    Thank you for choosing us as your health care provider.    Sincerely,   [facility]  [address] [place]  [place] [address]  Phone Number:  [phone]

## 2021-06-23 NOTE — TELEPHONE ENCOUNTER
Refill Approved    Rx renewed per Medication Renewal Policy. Medication was last renewed on 1/26/18.    Ov: 1/26/18    Cecilia Chen, [facility] Triage/Med Refill 1/11/2019     Requested Prescriptions   Pending Prescriptions Disp Refills     levonorgestrel-ethinyl estradiol (ENPRESSE) 50-30 (6)/75-40 (5)/125-30(10) per tablet [Pharmacy Med Name: LEVONOR/ETHINYL ESTRADIOL TABS 28S] 84 tablet 0     Sig: TAKE 1 TABLET BY MOUTH EVERY DAY    Oral Contraceptives Protocol Passed - 1/10/2019  6:54 PM       Passed - Visit with PCP or prescribing provider visit in last 12 months     Last office visit with prescriber/PCP: Visit date not found OR same dept: Visit date not found OR same specialty: 5/18/2016 Terrell Arias MD  Last physical: 1/26/2018 Last MTM visit: Visit date not found   Next visit within 3 mo: Visit date not found  Next physical within 3 mo: Visit date not found  Prescriber OR PCP: Marychuy Marion MD ([place])  Last diagnosis associated with med order: 1. Contraception  - levonorgestrel-ethinyl estradiol (ENPRESSE) 50-30 (6)/75-40 (5)/125-30(10) per tablet [Pharmacy Med Name: LEVONOR/ETHINYL ESTRADIOL TABS 28S]; TAKE 1 TABLET BY MOUTH EVERY DAY  Dispense: 84 tablet; Refill: 0    If protocol passes may refill for 12 months if within 3 months of last provider visit (or a total of 15 months).

## 2021-06-27 ENCOUNTER — HEALTH MAINTENANCE LETTER (OUTPATIENT)
Age: 56
End: 2021-06-27

## 2021-07-22 ENCOUNTER — OFFICE VISIT (OUTPATIENT)
Dept: FAMILY MEDICINE | Facility: CLINIC | Age: 56
End: 2021-07-22
Payer: COMMERCIAL

## 2021-07-22 VITALS
DIASTOLIC BLOOD PRESSURE: 60 MMHG | BODY MASS INDEX: 20.37 KG/M2 | WEIGHT: 115 LBS | HEART RATE: 70 BPM | SYSTOLIC BLOOD PRESSURE: 96 MMHG | OXYGEN SATURATION: 98 %

## 2021-07-22 DIAGNOSIS — Z79.890 HORMONE REPLACEMENT THERAPY (HRT): Chronic | ICD-10-CM

## 2021-07-22 DIAGNOSIS — B00.1 RECURRENT COLD SORES: Chronic | ICD-10-CM

## 2021-07-22 DIAGNOSIS — N18.31 STAGE 3A CHRONIC KIDNEY DISEASE (H): Chronic | ICD-10-CM

## 2021-07-22 DIAGNOSIS — E78.5 HYPERLIPIDEMIA LDL GOAL <130: ICD-10-CM

## 2021-07-22 DIAGNOSIS — R41.3 POOR SHORT TERM MEMORY: ICD-10-CM

## 2021-07-22 DIAGNOSIS — M12.9 ARTHRITIS/ARTHROPATHY OF MULTIPLE JOINTS: Chronic | ICD-10-CM

## 2021-07-22 DIAGNOSIS — F30.9 BIPOLAR I DISORDER, SINGLE MANIC EPISODE (H): Primary | ICD-10-CM

## 2021-07-22 PROBLEM — Z85.89 HISTORY OF SQUAMOUS CELL CARCINOMA: Chronic | Status: ACTIVE | Noted: 2017-01-11

## 2021-07-22 LAB
ALBUMIN SERPL-MCNC: 3.6 G/DL (ref 3.5–5)
ALP SERPL-CCNC: 56 U/L (ref 45–120)
ALT SERPL W P-5'-P-CCNC: 11 U/L (ref 0–45)
ANION GAP SERPL CALCULATED.3IONS-SCNC: 11 MMOL/L (ref 5–18)
AST SERPL W P-5'-P-CCNC: 20 U/L (ref 0–40)
BASOPHILS # BLD AUTO: 0 10E3/UL (ref 0–0.2)
BASOPHILS NFR BLD AUTO: 1 %
BILIRUB SERPL-MCNC: 0.4 MG/DL (ref 0–1)
BUN SERPL-MCNC: 27 MG/DL (ref 8–22)
CALCIUM SERPL-MCNC: 8.9 MG/DL (ref 8.5–10.5)
CHLORIDE BLD-SCNC: 104 MMOL/L (ref 98–107)
CHOLEST SERPL-MCNC: 205 MG/DL
CO2 SERPL-SCNC: 22 MMOL/L (ref 22–31)
CREAT SERPL-MCNC: 1.34 MG/DL (ref 0.6–1.1)
EOSINOPHIL # BLD AUTO: 0.3 10E3/UL (ref 0–0.7)
EOSINOPHIL NFR BLD AUTO: 5 %
ERYTHROCYTE [DISTWIDTH] IN BLOOD BY AUTOMATED COUNT: 14.1 % (ref 10–15)
FASTING STATUS PATIENT QL REPORTED: YES
GFR SERPL CREATININE-BSD FRML MDRD: 44 ML/MIN/1.73M2
GLUCOSE BLD-MCNC: 83 MG/DL (ref 70–125)
HCT VFR BLD AUTO: 40.2 % (ref 35–47)
HDLC SERPL-MCNC: 83 MG/DL
HGB BLD-MCNC: 13 G/DL (ref 11.7–15.7)
IMM GRANULOCYTES # BLD: 0 10E3/UL
IMM GRANULOCYTES NFR BLD: 0 %
LDLC SERPL CALC-MCNC: 93 MG/DL
LYMPHOCYTES # BLD AUTO: 1.4 10E3/UL (ref 0.8–5.3)
LYMPHOCYTES NFR BLD AUTO: 26 %
MCH RBC QN AUTO: 31 PG (ref 26.5–33)
MCHC RBC AUTO-ENTMCNC: 32.3 G/DL (ref 31.5–36.5)
MCV RBC AUTO: 96 FL (ref 78–100)
MONOCYTES # BLD AUTO: 0.5 10E3/UL (ref 0–1.3)
MONOCYTES NFR BLD AUTO: 8 %
NEUTROPHILS # BLD AUTO: 3.3 10E3/UL (ref 1.6–8.3)
NEUTROPHILS NFR BLD AUTO: 60 %
PLATELET # BLD AUTO: 240 10E3/UL (ref 150–450)
POTASSIUM BLD-SCNC: 4 MMOL/L (ref 3.5–5)
PROT SERPL-MCNC: 6.5 G/DL (ref 6–8)
RBC # BLD AUTO: 4.19 10E6/UL (ref 3.8–5.2)
SODIUM SERPL-SCNC: 137 MMOL/L (ref 136–145)
TRIGL SERPL-MCNC: 144 MG/DL
WBC # BLD AUTO: 5.5 10E3/UL (ref 4–11)

## 2021-07-22 PROCEDURE — 96127 BRIEF EMOTIONAL/BEHAV ASSMT: CPT | Mod: 59 | Performed by: FAMILY MEDICINE

## 2021-07-22 PROCEDURE — 99214 OFFICE O/P EST MOD 30 MIN: CPT | Performed by: FAMILY MEDICINE

## 2021-07-22 PROCEDURE — 80053 COMPREHEN METABOLIC PANEL: CPT | Performed by: FAMILY MEDICINE

## 2021-07-22 PROCEDURE — 36415 COLL VENOUS BLD VENIPUNCTURE: CPT | Performed by: FAMILY MEDICINE

## 2021-07-22 PROCEDURE — 85025 COMPLETE CBC W/AUTO DIFF WBC: CPT | Performed by: FAMILY MEDICINE

## 2021-07-22 PROCEDURE — 80061 LIPID PANEL: CPT | Performed by: FAMILY MEDICINE

## 2021-07-22 RX ORDER — DULOXETIN HYDROCHLORIDE 60 MG/1
60 CAPSULE, DELAYED RELEASE ORAL DAILY
COMMUNITY
Start: 2021-07-19 | End: 2022-04-01

## 2021-07-22 RX ORDER — QUETIAPINE FUMARATE 100 MG/1
100 TABLET, FILM COATED ORAL AT BEDTIME
COMMUNITY
Start: 2021-05-20 | End: 2023-03-22

## 2021-07-22 RX ORDER — LEVONORGESTREL AND ETHINYL ESTRADIOL 6-5-10
1 KIT ORAL DAILY
Qty: 90 TABLET | Refills: 1 | Status: SHIPPED | OUTPATIENT
Start: 2021-07-22 | End: 2021-11-23

## 2021-07-22 ASSESSMENT — ANXIETY QUESTIONNAIRES
GAD7 TOTAL SCORE: 15
6. BECOMING EASILY ANNOYED OR IRRITABLE: NEARLY EVERY DAY
5. BEING SO RESTLESS THAT IT IS HARD TO SIT STILL: SEVERAL DAYS
1. FEELING NERVOUS, ANXIOUS, OR ON EDGE: NEARLY EVERY DAY
7. FEELING AFRAID AS IF SOMETHING AWFUL MIGHT HAPPEN: SEVERAL DAYS
2. NOT BEING ABLE TO STOP OR CONTROL WORRYING: MORE THAN HALF THE DAYS
IF YOU CHECKED OFF ANY PROBLEMS ON THIS QUESTIONNAIRE, HOW DIFFICULT HAVE THESE PROBLEMS MADE IT FOR YOU TO DO YOUR WORK, TAKE CARE OF THINGS AT HOME, OR GET ALONG WITH OTHER PEOPLE: VERY DIFFICULT
3. WORRYING TOO MUCH ABOUT DIFFERENT THINGS: NEARLY EVERY DAY

## 2021-07-22 ASSESSMENT — PATIENT HEALTH QUESTIONNAIRE - PHQ9
SUM OF ALL RESPONSES TO PHQ QUESTIONS 1-9: 15
5. POOR APPETITE OR OVEREATING: MORE THAN HALF THE DAYS

## 2021-07-23 ASSESSMENT — ANXIETY QUESTIONNAIRES: GAD7 TOTAL SCORE: 15

## 2021-07-23 NOTE — PROGRESS NOTES
Patient states she is having increased ankle pain and also some nausea and would like some medication for both. Provider notified and orders are received.    SUBJECTIVE: Catherine Marte is a 56 year old White female who presents today with some paperwork to follow-up on her disability claim.  She has bipolar disorder with depression and anxiety and is seen by a psychiatrist Dr. Kelly.  She is on a number of medications including bupropion XL, Cymbalta, Neurontin, lamotrigine, Seroquel, topiramate and birth control tabs which she uses for suppression of her hot flashes.  She continues to have quite a bit of depression and anxiety today.  Her PHQ-9 score today is 15 and her CRISELDA-7 score is 15.  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  She recently had a new neuropsych test showing worsened symptoms of short-term memory.  Her anxiety and memory have made it impossible for her to return to her job.  She has had some mild chronic kidney disease with her last creatinine being 1.39 which is the highest its been.  This could be due to her use of NSAIDs as she is dealing with lots of arthropathies.  She also was found to have an elevated LDL at 142.  She has been working out and eating better.  Staying busy and being active help with her anxiety.  However they cannot help with her memory.  She has to write everything down and she shows me that in her phone she has made a series of lists of things to remember for doctor visits, going to the gym, going to the grocery store etc.  She says she still tends to forget things anyhow.  She is starting to get more and more anxious about this and fearful.  There is the possibility that this is a progressive condition.    OBJECTIVE: BP 96/60   Pulse 70   Wt 52.2 kg (115 lb)   LMP 07/21/2021   SpO2 98%   Breastfeeding No   BMI 20.37 kg/m    General: Very fit appearing normal weight middle-aged female in no acute physical distress  Heart: Regular rate and rhythm without murmur  Lungs: Clear bilaterally  Abdomen: Soft, nontender  Extremities: Warm, dry and without edema  Psych: Anxious, tearful at times,  fidgety    ASSESSMENT & PLAN:     Bipolar Disorder  Having both anxiety and depression.  PHQ-9 score today is 15, CRISELDA-7 score is 15.  Will monitor labs for her psych meds.  - CBC with platelets and differential  - CBC with platelets and differential    Poor short term memory  Seems to be progressive unfortunately.    Stage 3a chronic kidney disease  Last creatinine was 1.39.  Would like to recheck labs.  - Comprehensive metabolic panel (BMP + Alb, Alk Phos, ALT, AST, Total. Bili, TP)  - Comprehensive metabolic panel (BMP + Alb, Alk Phos, ALT, AST, Total. Bili, TP)    Hormone replacement therapy (HRT)  Needs refill.  - levonorg-eth estrad triphasic 50-30/75-40/ 125-30 MCG TABS  Dispense: 90 tablet; Refill: 1    Arthritis/arthropathy of multiple joints  Staying active helps.  She is seen by rheumatology.    Recurrent cold sores  More prevalent with stress.  Using Valtrex as soon as she gets symptoms.  Does not need a refill at this time.    Hyperlipidemia LDL goal <130  Last LDL was 142.  Will recheck as she feels she is eating better.  - Lipid panel reflex to direct LDL Fasting  - Lipid panel reflex to direct LDL Fasting      The form she gave me has been filled out and signed and I will put it on the chart.  I will get back to her on her lab results by my chart and only call with grossly abnormal values.  I will refill her meds as needed.  She should see me back in no longer than 6 months time.    Patient Active Problem List   Diagnosis     Bipolar Disorder     Tinea Versicolor     Menopausal Disorder     Recurrent cold sores     Surveillance of previously prescribed contraceptive pill     History of HPV infection     History of squamous cell carcinoma     Dystrophia, cornea, Cogan's     Disorder of thyroid     Arthritis/arthropathy of multiple joints     Chronic kidney disease, stage III (moderate)     Hormone replacement therapy (HRT)     Poor short term memory       Current Outpatient Medications   Medication      buPROPion (WELLBUTRIN XL) 150 MG 24 hr tablet     cholecalciferol, vitamin D3, 5,000 unit Tab     Cyanocobalamin (VITAMIN B-12 PO)     DULoxetine (CYMBALTA) 60 MG capsule     gabapentin (NEURONTIN) 300 MG capsule     lamoTRIgine (LAMICTAL) 200 MG tablet     levonorg-eth estrad triphasic 50-30/75-40/ 125-30 MCG TABS     levonorgestrel-ethinyl estradiol (ENPRESSE) 50-30 (6)/75-40 (5)/125-30(10) per tablet     LORazepam (ATIVAN) 0.5 MG tablet     QUEtiapine (SEROQUEL) 100 MG tablet     QUEtiapine (SEROQUEL) 300 MG tablet     topiramate (TOPAMAX) 100 MG tablet     valACYclovir (VALTREX) 1000 MG tablet     No current facility-administered medications for this visit.

## 2021-09-01 ENCOUNTER — TRANSFERRED RECORDS (OUTPATIENT)
Dept: HEALTH INFORMATION MANAGEMENT | Facility: CLINIC | Age: 56
End: 2021-09-01

## 2021-09-30 ENCOUNTER — TRANSFERRED RECORDS (OUTPATIENT)
Dept: HEALTH INFORMATION MANAGEMENT | Facility: CLINIC | Age: 56
End: 2021-09-30

## 2021-10-17 ENCOUNTER — HEALTH MAINTENANCE LETTER (OUTPATIENT)
Age: 56
End: 2021-10-17

## 2021-10-27 ENCOUNTER — TRANSFERRED RECORDS (OUTPATIENT)
Dept: HEALTH INFORMATION MANAGEMENT | Facility: CLINIC | Age: 56
End: 2021-10-27
Payer: COMMERCIAL

## 2021-11-22 DIAGNOSIS — Z79.890 HORMONE REPLACEMENT THERAPY (HRT): Chronic | ICD-10-CM

## 2021-11-24 RX ORDER — LEVONORGESTREL AND ETHINYL ESTRADIOL 6-5-10
1 KIT ORAL DAILY
Qty: 84 TABLET | OUTPATIENT
Start: 2021-11-24

## 2021-12-01 ENCOUNTER — HOSPITAL ENCOUNTER (OUTPATIENT)
Dept: MAMMOGRAPHY | Facility: CLINIC | Age: 56
Discharge: HOME | End: 2021-12-01
Attending: FAMILY MEDICINE | Admitting: FAMILY MEDICINE
Payer: COMMERCIAL

## 2021-12-01 DIAGNOSIS — Z12.31 VISIT FOR SCREENING MAMMOGRAM: ICD-10-CM

## 2021-12-01 PROCEDURE — 77063 BREAST TOMOSYNTHESIS BI: CPT

## 2022-01-12 ENCOUNTER — TRANSFERRED RECORDS (OUTPATIENT)
Dept: HEALTH INFORMATION MANAGEMENT | Facility: CLINIC | Age: 57
End: 2022-01-12
Payer: COMMERCIAL

## 2022-02-07 ENCOUNTER — TRANSFERRED RECORDS (OUTPATIENT)
Dept: HEALTH INFORMATION MANAGEMENT | Facility: CLINIC | Age: 57
End: 2022-02-07
Payer: COMMERCIAL

## 2022-02-11 ENCOUNTER — MYC MEDICAL ADVICE (OUTPATIENT)
Dept: FAMILY MEDICINE | Facility: CLINIC | Age: 57
End: 2022-02-11
Payer: COMMERCIAL

## 2022-02-20 ASSESSMENT — PATIENT HEALTH QUESTIONNAIRE - PHQ9
SUM OF ALL RESPONSES TO PHQ QUESTIONS 1-9: 10
10. IF YOU CHECKED OFF ANY PROBLEMS, HOW DIFFICULT HAVE THESE PROBLEMS MADE IT FOR YOU TO DO YOUR WORK, TAKE CARE OF THINGS AT HOME, OR GET ALONG WITH OTHER PEOPLE: SOMEWHAT DIFFICULT
SUM OF ALL RESPONSES TO PHQ QUESTIONS 1-9: 10

## 2022-02-21 ASSESSMENT — PATIENT HEALTH QUESTIONNAIRE - PHQ9: SUM OF ALL RESPONSES TO PHQ QUESTIONS 1-9: 10

## 2022-02-22 ENCOUNTER — OFFICE VISIT (OUTPATIENT)
Dept: FAMILY MEDICINE | Facility: CLINIC | Age: 57
End: 2022-02-22
Payer: COMMERCIAL

## 2022-02-22 VITALS
DIASTOLIC BLOOD PRESSURE: 60 MMHG | SYSTOLIC BLOOD PRESSURE: 100 MMHG | HEIGHT: 63 IN | OXYGEN SATURATION: 97 % | WEIGHT: 114 LBS | HEART RATE: 64 BPM | BODY MASS INDEX: 20.2 KG/M2

## 2022-02-22 DIAGNOSIS — F30.9 BIPOLAR I DISORDER, SINGLE MANIC EPISODE (H): Chronic | ICD-10-CM

## 2022-02-22 DIAGNOSIS — Z00.00 ROUTINE GENERAL MEDICAL EXAMINATION AT A HEALTH CARE FACILITY: Primary | ICD-10-CM

## 2022-02-22 DIAGNOSIS — K20.90 ESOPHAGITIS: ICD-10-CM

## 2022-02-22 DIAGNOSIS — F41.1 GAD (GENERALIZED ANXIETY DISORDER): ICD-10-CM

## 2022-02-22 DIAGNOSIS — M25.552 HIP PAIN, LEFT: ICD-10-CM

## 2022-02-22 DIAGNOSIS — Z79.890 HORMONE REPLACEMENT THERAPY (HRT): Chronic | ICD-10-CM

## 2022-02-22 DIAGNOSIS — Z13.220 SCREENING FOR LIPID DISORDERS: ICD-10-CM

## 2022-02-22 DIAGNOSIS — N18.31 STAGE 3A CHRONIC KIDNEY DISEASE (H): Chronic | ICD-10-CM

## 2022-02-22 DIAGNOSIS — Z11.4 SCREENING FOR HIV (HUMAN IMMUNODEFICIENCY VIRUS): ICD-10-CM

## 2022-02-22 LAB
ALBUMIN SERPL-MCNC: 4 G/DL (ref 3.5–5)
ALP SERPL-CCNC: 73 U/L (ref 45–120)
ALT SERPL W P-5'-P-CCNC: 20 U/L (ref 0–45)
ANION GAP SERPL CALCULATED.3IONS-SCNC: 10 MMOL/L (ref 5–18)
AST SERPL W P-5'-P-CCNC: 28 U/L (ref 0–40)
BILIRUB SERPL-MCNC: 0.5 MG/DL (ref 0–1)
BUN SERPL-MCNC: 30 MG/DL (ref 8–22)
CALCIUM SERPL-MCNC: 9.4 MG/DL (ref 8.5–10.5)
CHLORIDE BLD-SCNC: 106 MMOL/L (ref 98–107)
CHOLEST SERPL-MCNC: 250 MG/DL
CO2 SERPL-SCNC: 24 MMOL/L (ref 22–31)
CREAT SERPL-MCNC: 1.12 MG/DL (ref 0.6–1.1)
CREAT UR-MCNC: 112 MG/DL
FASTING STATUS PATIENT QL REPORTED: YES
GFR SERPL CREATININE-BSD FRML MDRD: 57 ML/MIN/1.73M2
GLUCOSE BLD-MCNC: 89 MG/DL (ref 70–125)
HDLC SERPL-MCNC: 88 MG/DL
HIV 1+2 AB+HIV1 P24 AG SERPL QL IA: NEGATIVE
LDLC SERPL CALC-MCNC: 148 MG/DL
MICROALBUMIN UR-MCNC: <0.5 MG/DL (ref 0–1.99)
MICROALBUMIN/CREAT UR: NORMAL MG/G{CREAT}
POTASSIUM BLD-SCNC: 4.4 MMOL/L (ref 3.5–5)
PROT SERPL-MCNC: 6.9 G/DL (ref 6–8)
SODIUM SERPL-SCNC: 140 MMOL/L (ref 136–145)
TRIGL SERPL-MCNC: 72 MG/DL

## 2022-02-22 PROCEDURE — 80053 COMPREHEN METABOLIC PANEL: CPT | Performed by: FAMILY MEDICINE

## 2022-02-22 PROCEDURE — 99396 PREV VISIT EST AGE 40-64: CPT | Performed by: FAMILY MEDICINE

## 2022-02-22 PROCEDURE — 80061 LIPID PANEL: CPT | Performed by: FAMILY MEDICINE

## 2022-02-22 PROCEDURE — 87389 HIV-1 AG W/HIV-1&-2 AB AG IA: CPT | Performed by: FAMILY MEDICINE

## 2022-02-22 PROCEDURE — 96127 BRIEF EMOTIONAL/BEHAV ASSMT: CPT | Performed by: FAMILY MEDICINE

## 2022-02-22 PROCEDURE — 36415 COLL VENOUS BLD VENIPUNCTURE: CPT | Performed by: FAMILY MEDICINE

## 2022-02-22 PROCEDURE — 82043 UR ALBUMIN QUANTITATIVE: CPT | Performed by: FAMILY MEDICINE

## 2022-02-22 PROCEDURE — 99214 OFFICE O/P EST MOD 30 MIN: CPT | Mod: 25 | Performed by: FAMILY MEDICINE

## 2022-02-22 RX ORDER — LEVONORGESTREL AND ETHINYL ESTRADIOL 6-5-10
1 KIT ORAL DAILY
Qty: 84 TABLET | Refills: 2 | Status: SHIPPED | OUTPATIENT
Start: 2022-02-22 | End: 2022-04-01

## 2022-02-22 ASSESSMENT — ANXIETY QUESTIONNAIRES
3. WORRYING TOO MUCH ABOUT DIFFERENT THINGS: MORE THAN HALF THE DAYS
5. BEING SO RESTLESS THAT IT IS HARD TO SIT STILL: SEVERAL DAYS
7. FEELING AFRAID AS IF SOMETHING AWFUL MIGHT HAPPEN: SEVERAL DAYS
GAD7 TOTAL SCORE: 14
IF YOU CHECKED OFF ANY PROBLEMS ON THIS QUESTIONNAIRE, HOW DIFFICULT HAVE THESE PROBLEMS MADE IT FOR YOU TO DO YOUR WORK, TAKE CARE OF THINGS AT HOME, OR GET ALONG WITH OTHER PEOPLE: VERY DIFFICULT
2. NOT BEING ABLE TO STOP OR CONTROL WORRYING: MORE THAN HALF THE DAYS
6. BECOMING EASILY ANNOYED OR IRRITABLE: NEARLY EVERY DAY
1. FEELING NERVOUS, ANXIOUS, OR ON EDGE: NEARLY EVERY DAY

## 2022-02-22 ASSESSMENT — PATIENT HEALTH QUESTIONNAIRE - PHQ9: 5. POOR APPETITE OR OVEREATING: MORE THAN HALF THE DAYS
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
## 2022-02-23 ASSESSMENT — ANXIETY QUESTIONNAIRES: GAD7 TOTAL SCORE: 14

## 2022-03-07 ENCOUNTER — TRANSFERRED RECORDS (OUTPATIENT)
Dept: HEALTH INFORMATION MANAGEMENT | Facility: CLINIC | Age: 57
End: 2022-03-07
Payer: COMMERCIAL

## 2022-03-18 PROBLEM — K20.80 ESOPHAGITIS, LOS ANGELES GRADE D: Status: ACTIVE | Noted: 2022-03-18

## 2022-04-01 ENCOUNTER — OFFICE VISIT (OUTPATIENT)
Dept: FAMILY MEDICINE | Facility: CLINIC | Age: 57
End: 2022-04-01
Payer: COMMERCIAL

## 2022-04-01 VITALS
HEART RATE: 70 BPM | OXYGEN SATURATION: 97 % | BODY MASS INDEX: 20.74 KG/M2 | WEIGHT: 118 LBS | DIASTOLIC BLOOD PRESSURE: 48 MMHG | SYSTOLIC BLOOD PRESSURE: 96 MMHG

## 2022-04-01 DIAGNOSIS — Z01.818 PREOP GENERAL PHYSICAL EXAM: Primary | ICD-10-CM

## 2022-04-01 DIAGNOSIS — N18.31 STAGE 3A CHRONIC KIDNEY DISEASE (H): Chronic | ICD-10-CM

## 2022-04-01 DIAGNOSIS — K22.89 ESOPHAGEAL THICKENING: ICD-10-CM

## 2022-04-01 PROCEDURE — 99214 OFFICE O/P EST MOD 30 MIN: CPT | Performed by: NURSE PRACTITIONER

## 2022-04-01 PROCEDURE — U0005 INFEC AGEN DETEC AMPLI PROBE: HCPCS | Performed by: NURSE PRACTITIONER

## 2022-04-01 PROCEDURE — U0003 INFECTIOUS AGENT DETECTION BY NUCLEIC ACID (DNA OR RNA); SEVERE ACUTE RESPIRATORY SYNDROME CORONAVIRUS 2 (SARS-COV-2) (CORONAVIRUS DISEASE [COVID-19]), AMPLIFIED PROBE TECHNIQUE, MAKING USE OF HIGH THROUGHPUT TECHNOLOGIES AS DESCRIBED BY CMS-2020-01-R: HCPCS | Performed by: NURSE PRACTITIONER

## 2022-04-01 RX ORDER — SUCRALFATE 1 G/1
1 TABLET ORAL 4 TIMES DAILY PRN
COMMUNITY
Start: 2022-03-08 | End: 2023-03-21

## 2022-04-01 NOTE — PATIENT INSTRUCTIONS
I will get your paperwork faxed to your surgeon.    Follow your surgeon's directions regarding eating and drinking prior to surgery.     Your surgeon will manage any complications after your procedure.
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
## 2022-04-02 LAB — SARS-COV-2 RNA RESP QL NAA+PROBE: NEGATIVE

## 2022-04-04 NOTE — PROGRESS NOTES
Pre-op faxed to [facility] at [phone]. Printed copy left in provider's fuller bin.     Gayle Mabry CMA.

## 2022-04-12 ENCOUNTER — TELEPHONE (OUTPATIENT)
Dept: FAMILY MEDICINE | Facility: CLINIC | Age: 57
End: 2022-04-12
Payer: COMMERCIAL

## 2022-04-12 ENCOUNTER — TRANSFERRED RECORDS (OUTPATIENT)
Dept: HEALTH INFORMATION MANAGEMENT | Facility: CLINIC | Age: 57
End: 2022-04-12
Payer: COMMERCIAL

## 2022-04-12 DIAGNOSIS — Z23 IMMUNIZATION DUE: Primary | ICD-10-CM

## 2022-04-12 NOTE — TELEPHONE ENCOUNTER
I put in an order for a Tdap for Catherine.  Please call her and have her set up a nurse only visit.

## 2022-04-12 NOTE — TELEPHONE ENCOUNTER
Reason for Call:  Other tdap    Detailed comments: pt is having a grandbaby and would like to get updates Tdap    Phone Number Patient can be reached at: Home number on file [phone] (home)    Best Time: anytime    Can we leave a detailed message on this number? YES    Call taken on 4/12/2022 at 11:22 AM by Jamie Sanchez

## 2022-04-18 ENCOUNTER — ALLIED HEALTH/NURSE VISIT (OUTPATIENT)
Dept: FAMILY MEDICINE | Facility: CLINIC | Age: 57
End: 2022-04-18
Payer: COMMERCIAL

## 2022-04-18 DIAGNOSIS — Z23 IMMUNIZATION DUE: ICD-10-CM

## 2022-04-18 PROCEDURE — 90471 IMMUNIZATION ADMIN: CPT

## 2022-04-18 PROCEDURE — 90715 TDAP VACCINE 7 YRS/> IM: CPT

## 2022-04-18 PROCEDURE — 99207 PR NO CHARGE NURSE ONLY: CPT

## 2022-04-22 PROBLEM — K44.9 HIATAL HERNIA: Status: ACTIVE | Noted: 2022-04-22

## 2022-10-01 ENCOUNTER — HEALTH MAINTENANCE LETTER (OUTPATIENT)
Age: 57
End: 2022-10-01

## 2022-12-05 ENCOUNTER — HOSPITAL ENCOUNTER (OUTPATIENT)
Dept: CT IMAGING | Facility: CLINIC | Age: 57
Discharge: HOME | End: 2022-12-05
Attending: INTERNAL MEDICINE | Admitting: INTERNAL MEDICINE
Payer: COMMERCIAL

## 2022-12-05 DIAGNOSIS — R59.1 LYMPHADENOPATHY: ICD-10-CM

## 2022-12-05 LAB
CREAT BLD-MCNC: 1.3 MG/DL (ref 0.6–1.1)
GFR SERPL CREATININE-BSD FRML MDRD: 48 ML/MIN/1.73M2

## 2022-12-05 PROCEDURE — 71260 CT THORAX DX C+: CPT

## 2022-12-05 PROCEDURE — 250N000011 HC RX IP 250 OP 636: Performed by: INTERNAL MEDICINE

## 2022-12-05 PROCEDURE — 82565 ASSAY OF CREATININE: CPT

## 2022-12-05 RX ORDER — IOPAMIDOL 755 MG/ML
100 INJECTION, SOLUTION INTRAVASCULAR ONCE
Status: COMPLETED | OUTPATIENT
Start: 2022-12-05 | End: 2022-12-05

## 2022-12-05 RX ADMIN — IOPAMIDOL 75 ML: 755 INJECTION, SOLUTION INTRAVENOUS at 14:57

## 2022-12-14 ENCOUNTER — TRANSFERRED RECORDS (OUTPATIENT)
Dept: HEALTH INFORMATION MANAGEMENT | Facility: CLINIC | Age: 57
End: 2022-12-14

## 2022-12-28 ENCOUNTER — MYC MEDICAL ADVICE (OUTPATIENT)
Dept: FAMILY MEDICINE | Facility: CLINIC | Age: 57
End: 2022-12-28

## 2022-12-28 ENCOUNTER — TELEPHONE (OUTPATIENT)
Dept: FAMILY MEDICINE | Facility: CLINIC | Age: 57
End: 2022-12-28

## 2022-12-28 NOTE — TELEPHONE ENCOUNTER
Forms Request  Name of form/paperwork: TRA Medical exam report  Have you been seen for this request: Yes:  2/22/22  Do we have the form: yes, in Dr. Marino inbox  When is form needed by: when done  How would you like the form returned: call at: [phone] to  when done  Patient Notified form requests are processed in 3-5 business days: yes    Okay to leave a detailed message? yes

## 2023-01-11 ENCOUNTER — TELEPHONE (OUTPATIENT)
Dept: FAMILY MEDICINE | Facility: CLINIC | Age: 58
End: 2023-01-11

## 2023-01-11 NOTE — TELEPHONE ENCOUNTER
Forms Request  Name of form/paperwork: (ALLSUP)  Have you been seen for this request: N/A  Do we have the form: yes, in () inbox  When is form needed by: when done  How would you like the form returned: fax ([phone])  Patient Notified form requests are processed in 3-5 business days: n/a         Okay to leave a detailed message? YES, CALL RONN ([phone]) WHEN DONE

## 2023-01-16 NOTE — TELEPHONE ENCOUNTER
Forms faxed to [facility] at [phone]. Copy left in provider's grey bin and original sent to scan.     Gayle Mabry CMA.

## 2023-02-07 ENCOUNTER — TRANSFERRED RECORDS (OUTPATIENT)
Dept: HEALTH INFORMATION MANAGEMENT | Facility: CLINIC | Age: 58
End: 2023-02-07

## 2023-02-13 ENCOUNTER — TRANSFERRED RECORDS (OUTPATIENT)
Dept: HEALTH INFORMATION MANAGEMENT | Facility: CLINIC | Age: 58
End: 2023-02-13

## 2023-02-27 ENCOUNTER — OFFICE VISIT (OUTPATIENT)
Dept: FAMILY MEDICINE | Facility: CLINIC | Age: 58
End: 2023-02-27
Payer: COMMERCIAL

## 2023-02-27 VITALS
RESPIRATION RATE: 14 BRPM | HEIGHT: 64 IN | WEIGHT: 117 LBS | DIASTOLIC BLOOD PRESSURE: 58 MMHG | HEART RATE: 63 BPM | TEMPERATURE: 98 F | OXYGEN SATURATION: 97 % | BODY MASS INDEX: 19.97 KG/M2 | SYSTOLIC BLOOD PRESSURE: 100 MMHG

## 2023-02-27 DIAGNOSIS — F32.9 REACTIVE DEPRESSION: ICD-10-CM

## 2023-02-27 DIAGNOSIS — Z13.220 SCREENING FOR LIPID DISORDERS: ICD-10-CM

## 2023-02-27 DIAGNOSIS — F03.A0 MILD DEMENTIA WITHOUT BEHAVIORAL DISTURBANCE, PSYCHOTIC DISTURBANCE, MOOD DISTURBANCE, OR ANXIETY, UNSPECIFIED DEMENTIA TYPE (H): ICD-10-CM

## 2023-02-27 DIAGNOSIS — K20.80 ESOPHAGITIS, LOS ANGELES GRADE D: ICD-10-CM

## 2023-02-27 DIAGNOSIS — R41.3 POOR SHORT TERM MEMORY: ICD-10-CM

## 2023-02-27 DIAGNOSIS — F41.9 ANXIETY: ICD-10-CM

## 2023-02-27 DIAGNOSIS — G47.9 DISORDERED SLEEP: ICD-10-CM

## 2023-02-27 DIAGNOSIS — F30.9 BIPOLAR I DISORDER, SINGLE MANIC EPISODE (H): Chronic | ICD-10-CM

## 2023-02-27 DIAGNOSIS — Z00.00 ROUTINE GENERAL MEDICAL EXAMINATION AT A HEALTH CARE FACILITY: Primary | ICD-10-CM

## 2023-02-27 DIAGNOSIS — N18.31 STAGE 3A CHRONIC KIDNEY DISEASE (H): Chronic | ICD-10-CM

## 2023-02-27 DIAGNOSIS — R47.01 APHASIA: ICD-10-CM

## 2023-02-27 DIAGNOSIS — Z79.899 POLYPHARMACY: ICD-10-CM

## 2023-02-27 DIAGNOSIS — M12.9 ARTHRITIS/ARTHROPATHY OF MULTIPLE JOINTS: Chronic | ICD-10-CM

## 2023-02-27 LAB
ALBUMIN SERPL BCG-MCNC: 4.4 G/DL (ref 3.5–5.2)
ALP SERPL-CCNC: 109 U/L (ref 35–104)
ALT SERPL W P-5'-P-CCNC: 41 U/L (ref 10–35)
ANION GAP SERPL CALCULATED.3IONS-SCNC: 11 MMOL/L (ref 7–15)
AST SERPL W P-5'-P-CCNC: 38 U/L (ref 10–35)
BILIRUB SERPL-MCNC: 0.3 MG/DL
BUN SERPL-MCNC: 31.7 MG/DL (ref 6–20)
CALCIUM SERPL-MCNC: 9.7 MG/DL (ref 8.6–10)
CHLORIDE SERPL-SCNC: 107 MMOL/L (ref 98–107)
CHOLEST SERPL-MCNC: 266 MG/DL
CREAT SERPL-MCNC: 1.37 MG/DL (ref 0.51–0.95)
CREAT UR-MCNC: 147 MG/DL
DEPRECATED HCO3 PLAS-SCNC: 23 MMOL/L (ref 22–29)
GFR SERPL CREATININE-BSD FRML MDRD: 45 ML/MIN/1.73M2
GLUCOSE SERPL-MCNC: 104 MG/DL (ref 70–99)
HDLC SERPL-MCNC: 78 MG/DL
HGB BLD-MCNC: 13.9 G/DL (ref 11.7–15.7)
LDLC SERPL CALC-MCNC: 170 MG/DL
MICROALBUMIN UR-MCNC: <12 MG/L
MICROALBUMIN/CREAT UR: NORMAL MG/G{CREAT}
NONHDLC SERPL-MCNC: 188 MG/DL
POTASSIUM SERPL-SCNC: 4.4 MMOL/L (ref 3.4–5.3)
PROT SERPL-MCNC: 7 G/DL (ref 6.4–8.3)
SODIUM SERPL-SCNC: 141 MMOL/L (ref 136–145)
TRIGL SERPL-MCNC: 91 MG/DL

## 2023-02-27 PROCEDURE — 82570 ASSAY OF URINE CREATININE: CPT | Performed by: FAMILY MEDICINE

## 2023-02-27 PROCEDURE — 36415 COLL VENOUS BLD VENIPUNCTURE: CPT | Performed by: FAMILY MEDICINE

## 2023-02-27 PROCEDURE — 80053 COMPREHEN METABOLIC PANEL: CPT | Performed by: FAMILY MEDICINE

## 2023-02-27 PROCEDURE — 99215 OFFICE O/P EST HI 40 MIN: CPT | Mod: 25 | Performed by: FAMILY MEDICINE

## 2023-02-27 PROCEDURE — 82043 UR ALBUMIN QUANTITATIVE: CPT | Performed by: FAMILY MEDICINE

## 2023-02-27 PROCEDURE — 85018 HEMOGLOBIN: CPT | Performed by: FAMILY MEDICINE

## 2023-02-27 PROCEDURE — 99396 PREV VISIT EST AGE 40-64: CPT | Performed by: FAMILY MEDICINE

## 2023-02-27 PROCEDURE — 80061 LIPID PANEL: CPT | Performed by: FAMILY MEDICINE

## 2023-02-27 RX ORDER — DEXTROAMPHETAMINE SACCHARATE, AMPHETAMINE ASPARTATE, DEXTROAMPHETAMINE SULFATE AND AMPHETAMINE SULFATE 5; 5; 5; 5 MG/1; MG/1; MG/1; MG/1
TABLET ORAL
COMMUNITY
Start: 2023-02-24 | End: 2023-03-21

## 2023-02-27 RX ORDER — QUETIAPINE FUMARATE 400 MG/1
400 TABLET, FILM COATED ORAL AT BEDTIME
COMMUNITY
Start: 2023-01-23

## 2023-02-27 RX ORDER — GABAPENTIN 300 MG/1
600 CAPSULE ORAL AT BEDTIME
COMMUNITY
Start: 2022-04-12 | End: 2023-03-22

## 2023-02-27 RX ORDER — FLUOXETINE 40 MG/1
40 CAPSULE ORAL DAILY
COMMUNITY
Start: 2023-02-21

## 2023-02-27 ASSESSMENT — ANXIETY QUESTIONNAIRES
IF YOU CHECKED OFF ANY PROBLEMS ON THIS QUESTIONNAIRE, HOW DIFFICULT HAVE THESE PROBLEMS MADE IT FOR YOU TO DO YOUR WORK, TAKE CARE OF THINGS AT HOME, OR GET ALONG WITH OTHER PEOPLE: VERY DIFFICULT
6. BECOMING EASILY ANNOYED OR IRRITABLE: NEARLY EVERY DAY
5. BEING SO RESTLESS THAT IT IS HARD TO SIT STILL: NEARLY EVERY DAY
7. FEELING AFRAID AS IF SOMETHING AWFUL MIGHT HAPPEN: SEVERAL DAYS
7. FEELING AFRAID AS IF SOMETHING AWFUL MIGHT HAPPEN: SEVERAL DAYS
8. IF YOU CHECKED OFF ANY PROBLEMS, HOW DIFFICULT HAVE THESE MADE IT FOR YOU TO DO YOUR WORK, TAKE CARE OF THINGS AT HOME, OR GET ALONG WITH OTHER PEOPLE?: VERY DIFFICULT
2. NOT BEING ABLE TO STOP OR CONTROL WORRYING: MORE THAN HALF THE DAYS
3. WORRYING TOO MUCH ABOUT DIFFERENT THINGS: SEVERAL DAYS
GAD7 TOTAL SCORE: 16
1. FEELING NERVOUS, ANXIOUS, OR ON EDGE: NEARLY EVERY DAY
GAD7 TOTAL SCORE: 16
GAD7 TOTAL SCORE: 16
4. TROUBLE RELAXING: NEARLY EVERY DAY

## 2023-02-27 ASSESSMENT — ENCOUNTER SYMPTOMS
CONSTIPATION: 1
BREAST MASS: 0
NERVOUS/ANXIOUS: 1
HEARTBURN: 1

## 2023-02-27 ASSESSMENT — PATIENT HEALTH QUESTIONNAIRE - PHQ9
SUM OF ALL RESPONSES TO PHQ QUESTIONS 1-9: 10
SUM OF ALL RESPONSES TO PHQ QUESTIONS 1-9: 10
10. IF YOU CHECKED OFF ANY PROBLEMS, HOW DIFFICULT HAVE THESE PROBLEMS MADE IT FOR YOU TO DO YOUR WORK, TAKE CARE OF THINGS AT HOME, OR GET ALONG WITH OTHER PEOPLE: SOMEWHAT DIFFICULT
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
## 2023-03-01 ENCOUNTER — TELEPHONE (OUTPATIENT)
Dept: FAMILY MEDICINE | Facility: CLINIC | Age: 58
End: 2023-03-01
Payer: COMMERCIAL

## 2023-03-01 NOTE — TELEPHONE ENCOUNTER
[employer] message sent with MTM information and scheduling phone number.       Rose Goodman, PharmD, BCACP  Medication Management (MTM) Pharmacist  [facility]

## 2023-03-01 NOTE — TELEPHONE ENCOUNTER
MTM referral from: [facility] visit (referral by provider) for please look at patients medlist--psych meds --sees outside psych NP. Do you agree or have recommendations?    MT referral outreach attempt #2 on March 1, 2023 at 11:32 AM      Outcome: Patient not reachable after several attempts, will route to [facility] Pharmacist/Provider as an FYI.  [facility] scheduling number is [phone].  Thank you for the referral.    Marilyn Landa [facility]     Patient has [employer] coverage

## 2023-03-02 ENCOUNTER — TELEPHONE (OUTPATIENT)
Dept: FAMILY MEDICINE | Facility: CLINIC | Age: 58
End: 2023-03-02
Payer: COMMERCIAL

## 2023-03-02 ENCOUNTER — MYC MEDICAL ADVICE (OUTPATIENT)
Dept: FAMILY MEDICINE | Facility: CLINIC | Age: 58
End: 2023-03-02
Payer: COMMERCIAL

## 2023-03-07 NOTE — TELEPHONE ENCOUNTER
Can we please call Catherine and let her know that [facility] is trying to get a hold of her?  Can you give her the contact information?  She might not be paying attention to her MyChart.  Thank you

## 2023-03-07 NOTE — TELEPHONE ENCOUNTER
Spoke with pt. Pt was in [place] for the last week. Spoke with MTM yesterday and scheduled an appt for 3/21/23 @ 2pm    Luh Owen MA on 3/7/2023 at 5:08 PM

## 2023-03-13 ENCOUNTER — HOSPITAL ENCOUNTER (OUTPATIENT)
Dept: MAMMOGRAPHY | Facility: CLINIC | Age: 58
Discharge: HOME | End: 2023-03-13
Attending: FAMILY MEDICINE
Payer: COMMERCIAL

## 2023-03-13 ENCOUNTER — HOSPITAL ENCOUNTER (OUTPATIENT)
Dept: MRI IMAGING | Facility: CLINIC | Age: 58
Discharge: HOME | End: 2023-03-13
Attending: FAMILY MEDICINE
Payer: COMMERCIAL

## 2023-03-13 DIAGNOSIS — Z12.31 VISIT FOR SCREENING MAMMOGRAM: ICD-10-CM

## 2023-03-13 DIAGNOSIS — R47.01 APHASIA: ICD-10-CM

## 2023-03-13 DIAGNOSIS — G47.9 DISORDERED SLEEP: ICD-10-CM

## 2023-03-13 DIAGNOSIS — R41.3 POOR SHORT TERM MEMORY: ICD-10-CM

## 2023-03-13 PROCEDURE — 70553 MRI BRAIN STEM W/O & W/DYE: CPT

## 2023-03-13 PROCEDURE — 77067 SCR MAMMO BI INCL CAD: CPT

## 2023-03-13 PROCEDURE — 255N000002 HC RX 255 OP 636: Performed by: FAMILY MEDICINE

## 2023-03-13 PROCEDURE — A9585 GADOBUTROL INJECTION: HCPCS | Performed by: FAMILY MEDICINE

## 2023-03-13 RX ORDER — GADOBUTROL 604.72 MG/ML
5 INJECTION INTRAVENOUS ONCE
Status: COMPLETED | OUTPATIENT
Start: 2023-03-13 | End: 2023-03-13

## 2023-03-13 RX ADMIN — GADOBUTROL 5 ML: 604.72 INJECTION INTRAVENOUS at 14:27

## 2023-03-21 ENCOUNTER — OFFICE VISIT (OUTPATIENT)
Dept: PHARMACY | Facility: CLINIC | Age: 58
End: 2023-03-21
Attending: FAMILY MEDICINE
Payer: COMMERCIAL

## 2023-03-21 DIAGNOSIS — K20.80 ESOPHAGITIS, LOS ANGELES GRADE D: ICD-10-CM

## 2023-03-21 DIAGNOSIS — F30.9 BIPOLAR I DISORDER, SINGLE MANIC EPISODE (H): Primary | Chronic | ICD-10-CM

## 2023-03-21 DIAGNOSIS — M12.9 ARTHRITIS/ARTHROPATHY OF MULTIPLE JOINTS: Chronic | ICD-10-CM

## 2023-03-21 DIAGNOSIS — B00.1 RECURRENT COLD SORES: Chronic | ICD-10-CM

## 2023-03-21 DIAGNOSIS — K44.9 HIATAL HERNIA: ICD-10-CM

## 2023-03-21 DIAGNOSIS — Z78.9 TAKES DIETARY SUPPLEMENTS: ICD-10-CM

## 2023-03-21 PROCEDURE — 99607 MTMS BY PHARM ADDL 15 MIN: CPT | Performed by: PHARMACIST

## 2023-03-21 PROCEDURE — 99605 MTMS BY PHARM NP 15 MIN: CPT | Performed by: PHARMACIST

## 2023-03-21 RX ORDER — SUCRALFATE 1 G/1
1 TABLET ORAL 2 TIMES DAILY PRN
Qty: 60 TABLET | Refills: 3 | Status: SHIPPED | OUTPATIENT
Start: 2023-03-21 | End: 2024-02-26

## 2023-03-21 RX ORDER — MULTIVITAMIN,THERAPEUTIC
1 TABLET ORAL DAILY
COMMUNITY

## 2023-03-21 RX ORDER — DEXTROAMPHETAMINE SACCHARATE, AMPHETAMINE ASPARTATE, DEXTROAMPHETAMINE SULFATE AND AMPHETAMINE SULFATE 5; 5; 5; 5 MG/1; MG/1; MG/1; MG/1
1 TABLET ORAL DAILY
COMMUNITY
Start: 2023-03-21 | End: 2023-03-21

## 2023-03-21 NOTE — PROGRESS NOTES
Medication Therapy Management (MTM) Encounter    ASSESSMENT:                            1. Bipolar Disorder  Some worsening of symptoms after recent medication changes, but continuing to make adjustments and work with psych.  Dose of fluoxetine does not need to be adjusted based on kidney function - per psych note this was decreased due to feeling stable and open to dose reduction. Dose of Wellbutrin is recommended to be lowered with CrCl <60 ml/min. Patient will be having renal function rechecked soon now that she has stopped NSAID use and focusing on adequate hydration so may be able to increase dose again. Of note, patient does have history of eating disorder, currently controlled on topiramate, but this is a contraindication with use of Wellbutrin given increased risk of seizure. Also, Seroquel and fluoxetine can enhance the neuroexcitatory and/or seizure-potentiating effect of Wellbutrin. Patient verbalized understanding and accepts this risk given how long she has done well on this combination without adverse effect. Can discuss further with psych provider as well. Discovered discrepancy in Seroquel dosing - patient taking 100 mg + 400 mg tablets for total dose of 500 mg at bedtime, when per psych note, dose should just be 400 mg. Advised patient decrease to 400 mg, which she uses primarily for sleep.   Another medication interaction noted is between lamotrigine and topiramate. Topiramate may enhance the arrhythmogenic effect of lamotrigine. More of a risk in those with clinically significant structural or functional cardiac disease, lamotrigine may slow ventricular conduction (widen the QRS interval) and induce proarrhythmia. Consider EKG monitoring at next PCP visit.   Also on several CNS depressants increasing risk of CNS depression and cognitive impairment. This has been considered by neurology, especially topiramate medication, given dementia diagnosis - now on lower dose. This medication has been helpful  for management of eating disorder.     2. Esophagitis/Hiatal hernia   Advised regular, daily use of omeprazole for optimal benefit. Recommended administration is every morning 30 minutes before breakfast. Has found sucralfate effective when used PRN.     3. Arthritis/arthropathy   Advise trial off gabapentin to assess ongoing need as this was initially prescribed for paresthesias years ago and patient's primary complaint is hand stiffness which acetaminophen was recommended per rheumatology. Would also help lessen use of sedating medications and simplify patient's medication regimen. Medication education provided on appropriate acetaminophen dosing. Avoid NSAIDs due to CKD.     4. Takes dietary supplements  Reviewed supplement use with patient, including indications, possible benefits and risks. Reasonable to continue. Did suggest putting fish oil supplement in the fridge or freezer to help prevent fishy aftertaste.     PLAN:                            Do not take ibuprofen. Stay well hydrated.   Check BMP. Has lab scheduled with psych regarding Wellbutrin dosing.     Decrease dose of Seroquel to 400 mg at bedtime as advised by psych. (Should not be taking extra 100 mg tablet)     Consider monitoring EKG given combination of lamotrigine and topiramate.     Taking omeprazole 20 mg every morning on empty stomach 30 minutes before breakfast.     Keep fish oil supplement in the fridge.     Wean off gabapentin by taking 1 capsule (300 mg) at bedtime for 5 days, then 1 capsule (300 mg) every other day for 5 days, then stop.     For hand stiffness, use acetaminophen instead - try Tylenol Arthritis ER 2 tablets (1300 mg) every 8 hours as needed.       Follow-up: Return in about 3 months (around 6/21/2023).    SUBJECTIVE/OBJECTIVE:                          Catherine Marte is a 57 year old female coming in for an initial visit. She was referred to me from Marychuy Frye.      Reason for visit: review psych  "medications.    Allergies/ADRs: Reviewed in chart  Past Medical History: Reviewed in chart  Tobacco: She reports that she has never smoked. She has never used smokeless tobacco.  Alcohol: Reviewed in chart    Medication Adherence/Access: Diagnosed with early onset dementia. Patient takes medications 2 time(s) per day.     1. Bipolar Disorder/CRISELDA  Current medications:  Wellbutrin  mg daily  Fluoxetine 40 mg daily   Lamotrigine 200 mg daily  Seroquel 400 mg + 100 mg at bedtime for sleep.   Topiramate 100 mg at bedtime  Lorazepam 0.5 mg every 8 hours PRN (infrequent use; \"6-8 times per year\")    Diagnosed with bipolar, CRISELDA, and early onset dementia. Follows with psych Elham Hernandez NP at [facility] who is relatively new to her after her previous psychiatrist retired. Per patient, psych provider was concerned with her kidney function and taking Wellbutrin and fluoxetine, so doses were lowered. Decreased fluoxetine from 60 mg to 40 mg daily and Wellbutrin from 450 mg to 150 mg daily. Happened quickly and was a tough transition about a month ago she says. Plan is to go back up on Wellbutrin dose and stay at current dose of fluoxetine per patient report. She was prescribe Adderall to help with concentration, energy, etc after lowering Wellbutrin, but patient says this has had no effect.     History of manic episodes for several years. Has not happened since started taking lamotrigine. Taking Seroquel 400 mg + 100 mg at bedtime for sleep. Can't asleep without it. Taking topiramate 100 mg at bedtime for eating disorder. Dose was decreased from 300 mg to 100 mg due to cognitive concerns. Liked being on higher dose. Has found really helpful for managing eating disorder. Decreased to see if affecting her neurologically, but no real change per her report. Had neuropysch tests which have been worsening.     2. Esophagitis/Hiatal hernia   Current medications include: Prilosec (omeprazole) 20 mg daily and sucralfate 1 gm tablet PRN " with stomach upset/abdominal pain. Admits that she's not good at taking omeprazole regularly every day. Tends to have symptoms when she eats breakfast like eggs and toast, but not as much if she has a liquid protein shake.     3. Arthritis/arthropathy   Has seen rheumatology in the past for hand pain, particularly stiffness. Also at the time of her appointment in 2020, she mentioned arm and leg paresthesias. Rheumatology recommended acetaminophen for the arthralgia and gabapentin for the paresthesias and referral to neurology. Also recommended paraffin wax machine, which she did try, along with PT. She continues to take gabapentin 300-600 mg at bedtime, but is under the impression this is for her hand stiffness and weakness. Requesting refill as no longer sees rheum. She was taking ibuprofen regularly, but has stopped a couple weeks ago due to increase in creatinine. Now using acetaminophen instead.     4. Takes dietary supplements  Currently taking vitamin D, fish oil, multivitamin, and vitamin B12. Taking these for general health. Does complain of bothersome fishy aftertaste after she takes the fish oil supplement.     Lab Results   Component Value Date    B12 641 02/17/2020       Vitals:  BP Readings from Last 3 Encounters:   02/27/23 100/58   04/01/22 96/48   02/22/22 100/60      Pulse Readings from Last 3 Encounters:   02/27/23 63   04/01/22 70   02/22/22 64     Wt Readings from Last 3 Encounters:   02/27/23 117 lb (53.1 kg)   04/01/22 118 lb (53.5 kg)   02/22/22 114 lb (51.7 kg)      ----------------    I spent 45 minutes with this patient today. All changes were made via collaborative practice agreement with Marychuy Marion MD. A copy of the visit note was provided to the patient's provider(s).    A summary of these recommendations was given to the patient.    Rose Goodman, AlfonsoD, BCACP  Medication Management (MTM) Pharmacist  [facility]        Medication Therapy  Recommendations  Arthritis/arthropathy of multiple joints    Current Medication: gabapentin (NEURONTIN) 300 MG capsule (Discontinued)   Rationale: More effective medication available - Ineffective medication - Effectiveness   Recommendation: Change Medication - acetaminophen 650 MG CR tablet   Status: Accepted per CPA         Bipolar I disorder, single manic episode (H)    Current Medication: QUEtiapine (SEROQUEL) 400 MG tablet   Rationale: Dose too high - Dosage too high - Safety   Recommendation: Decrease Dose   Status: Accepted per Provider          Current Medication: buPROPion (WELLBUTRIN XL) 150 MG 24 hr tablet   Rationale: Medication requires monitoring - Needs additional monitoring - Safety   Recommendation: Order Lab   Status: Accepted per CPA         Hiatal hernia    Current Medication: omeprazole (PRILOSEC) 20 MG DR capsule   Rationale: Patient forgets to take - Adherence - Adherence   Recommendation: Provide Education   Status: Patient Agreed - Adherence/Education

## 2023-03-22 NOTE — PATIENT INSTRUCTIONS
"Recommendations from today's Medication Management (MTM) visit:                                                      Do not take ibuprofen. Stay well hydrated.   Check kidney function. This will help make sure you are on an appropriate dose of Wellbutrin.     Decrease dose of Seroquel to 400 mg at bedtime. You should not be taking the extra 100 mg tablet.     Consider monitoring EKG given combination of lamotrigine and topiramate. Discuss with Dr. Marion at next appointment.     Taking omeprazole 20 mg every morning on empty stomach 30 minutes before breakfast.     Keep fish oil supplement in the fridge to decrease fishy aftertaste.     Wean off gabapentin by taking 1 capsule (300 mg) at bedtime for 5 days, then 1 capsule (300 mg) every other day for 5 days, then stop. This doesn't usually help symptoms of hand stiffness. The rheumatologist had recommended this for tingling, nerve discomfort you were feeling at the time, and recommended acetaminophen for the hand stiffness.     For hand stiffness, use acetaminophen instead - try Tylenol Arthritis ER 2 tablets (1300 mg) every 8 hours as needed.       To schedule another MTM appointment, please call the MTM scheduling line at [phone] or toll-free at [phone].     My MTM pharmacist's contact information:      Please feel free to contact me with any questions or concerns you have via J[employer] or calling the clinic.       Rose Goodman, PharmD, [facility] Center[facility]  Medication Management (MTM) Pharmacist  [facility]     It was great speaking with you today.  I value your experience and would be very thankful for your time in providing feedback in our clinic survey. In the next few days, you may receive an email or text message from [employer] with a link to a survey related to your  clinical pharmacist.\"     "

## 2023-06-09 ENCOUNTER — VIRTUAL VISIT (OUTPATIENT)
Dept: FAMILY MEDICINE | Facility: CLINIC | Age: 58
End: 2023-06-09
Payer: COMMERCIAL

## 2023-06-09 DIAGNOSIS — S00.06XA TICK BITE OF SCALP, INITIAL ENCOUNTER: Primary | ICD-10-CM
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
## 2023-06-09 PROCEDURE — 99213 OFFICE O/P EST LOW 20 MIN: CPT | Mod: VID | Performed by: FAMILY MEDICINE

## 2023-06-09 RX ORDER — DOXYCYCLINE 100 MG/1
100 CAPSULE ORAL 2 TIMES DAILY
Qty: 28 CAPSULE | Refills: 0 | Status: SHIPPED | OUTPATIENT
Start: 2023-06-09 | End: 2023-06-23

## 2023-06-09 ASSESSMENT — PATIENT HEALTH QUESTIONNAIRE - PHQ9
SUM OF ALL RESPONSES TO PHQ QUESTIONS 1-9: 8
SUM OF ALL RESPONSES TO PHQ QUESTIONS 1-9: 8
10. IF YOU CHECKED OFF ANY PROBLEMS, HOW DIFFICULT HAVE THESE PROBLEMS MADE IT FOR YOU TO DO YOUR WORK, TAKE CARE OF THINGS AT HOME, OR GET ALONG WITH OTHER PEOPLE: SOMEWHAT DIFFICULT

## 2023-06-09 NOTE — PROGRESS NOTES
Catherine is a 58 year old who is being evaluated via a billable video visit.      How would you like to obtain your AVS? MyChart  If the video visit is dropped, the invitation should be resent by: Text to cell phone: [phone]  Will anyone else be joining your video visit? No          Assessment & Plan     Tick bite of scalp, initial encounter  -Catherine was working in yard and noticed 2 deer ticks the week before. She has been having fever , joint pain , headaches for the past 1 week.  -Otherwise very healthy, working out 6 days/week,for the past 1 week feeling more tired.  -H/o severe Lymes in the past and hence preferred to be proactive this time and start antibiotics before it is too late. No bullseye lesion noted       PLAN:  -Prescribed doxycycline and ordered Lymes disease test.  -Recommended to follow up in the clinic if symptoms are not improving for further evaluation since we are empirically treating for Lymes based on pts history.     - doxycycline monohydrate (MONODOX) 100 MG capsule; Take 1 capsule (100 mg) by mouth 2 times daily for 14 days             Akila Sen MD  [facility]    Brad Alexander is a 58 year old, presenting for the following health issues:  No chief complaint on file.        2/27/2023    12:26 PM   Additional Questions   Roomed by Gen GRETCHEN CMA     History of Present Illness       Reason for visit:  Headache, fever, meds, fatigue    She eats 4 or more servings of fruits and vegetables daily.She consumes 0 sweetened beverage(s) daily.She exercises with enough effort to increase her heart rate 60 or more minutes per day.  She exercises with enough effort to increase her heart rate 6 days per week.   She is taking medications regularly.    Today's PHQ-9         PHQ-9 Total Score: 8    PHQ-9 Q9 Thoughts of better off dead/self-harm past 2 weeks :   Not at all    How difficult have these problems made it for you to do your work, take  care of things at home, or get along with other people: Somewhat difficult       Concern - maybe a tick bite   Onset: 5 days   Description: headache fatigue and fever   Intensity: moderate  Progression of Symptoms:  worsening  Accompanying Signs & Symptoms: none   Previous history of similar problem: yes   Precipitating factors:        Worsened by: none   Alleviating factors:        Improved by: medication   Therapies tried and outcome:  none         Review of Systems   Constitutional, HEENT, cardiovascular, pulmonary, gi and gu systems are negative, except as otherwise noted.      Objective           Vitals:  No vitals were obtained today due to virtual visit.    Physical Exam   GENERAL: Healthy, alert and no distress  EYES: Eyes grossly normal to inspection.  No discharge or erythema, or obvious scleral/conjunctival abnormalities.  RESP: No audible wheeze, cough, or visible cyanosis.  No visible retractions or increased work of breathing.    SKIN: Visible skin clear. No significant rash, abnormal pigmentation or lesions.  NEURO: Cranial nerves grossly intact.  Mentation and speech appropriate for age.  PSYCH: Mentation appears normal, affect normal/bright, judgement and insight intact, normal speech and appearance well-groomed.          Video-Visit Details    Type of service:  Video Visit     Originating Location (pt. Location): Home    Distant Location (provider location):  On-site  Platform used for Video Visit: SynGen    Patient answers are not available for this visit.

## 2023-07-20 ENCOUNTER — TELEPHONE (OUTPATIENT)
Dept: FAMILY MEDICINE | Facility: CLINIC | Age: 58
End: 2023-07-20
Payer: COMMERCIAL

## 2023-07-20 NOTE — TELEPHONE ENCOUNTER
Forms Request    Name of form/paperwork:  [employer]    Have you been seen for this request: yes    Do we have the form: placed in provider mailboxDoni    When is form needed by: when complete  How would you like the form returned: fax [phone]    Patient Notified form requests are processed in 3-5 business days: na    Okay to leave a detailed message? na

## 2023-08-11 ENCOUNTER — OFFICE VISIT (OUTPATIENT)
Dept: FAMILY MEDICINE | Facility: CLINIC | Age: 58
End: 2023-08-11
Payer: COMMERCIAL

## 2023-08-11 VITALS
OXYGEN SATURATION: 97 % | RESPIRATION RATE: 16 BRPM | DIASTOLIC BLOOD PRESSURE: 73 MMHG | SYSTOLIC BLOOD PRESSURE: 109 MMHG | HEART RATE: 63 BPM | TEMPERATURE: 97.5 F

## 2023-08-11 DIAGNOSIS — R59.1 LYMPHADENOPATHY: Primary | ICD-10-CM

## 2023-08-11 DIAGNOSIS — R94.6 THYROID FUNCTION STUDY ABNORMALITY: ICD-10-CM

## 2023-08-11 DIAGNOSIS — F30.9 BIPOLAR I DISORDER, SINGLE MANIC EPISODE (H): Chronic | ICD-10-CM

## 2023-08-11 DIAGNOSIS — K04.7 DENTAL ABSCESS: ICD-10-CM

## 2023-08-11 LAB
ANION GAP SERPL CALCULATED.3IONS-SCNC: 12 MMOL/L (ref 7–15)
BASOPHILS # BLD AUTO: 0 10E3/UL (ref 0–0.2)
BASOPHILS NFR BLD AUTO: 0 %
BUN SERPL-MCNC: 38.4 MG/DL (ref 6–20)
CALCIUM SERPL-MCNC: 9.7 MG/DL (ref 8.6–10)
CHLORIDE SERPL-SCNC: 106 MMOL/L (ref 98–107)
CREAT SERPL-MCNC: 1.21 MG/DL (ref 0.51–0.95)
DEPRECATED HCO3 PLAS-SCNC: 24 MMOL/L (ref 22–29)
EOSINOPHIL # BLD AUTO: 0.3 10E3/UL (ref 0–0.7)
EOSINOPHIL NFR BLD AUTO: 4 %
ERYTHROCYTE [DISTWIDTH] IN BLOOD BY AUTOMATED COUNT: 12.9 % (ref 10–15)
GFR SERPL CREATININE-BSD FRML MDRD: 52 ML/MIN/1.73M2
GLUCOSE SERPL-MCNC: 77 MG/DL (ref 70–99)
HCT VFR BLD AUTO: 43.3 % (ref 35–47)
HGB BLD-MCNC: 13.7 G/DL (ref 11.7–15.7)
IMM GRANULOCYTES # BLD: 0 10E3/UL
IMM GRANULOCYTES NFR BLD: 0 %
LYMPHOCYTES # BLD AUTO: 1.6 10E3/UL (ref 0.8–5.3)
LYMPHOCYTES NFR BLD AUTO: 19 %
MCH RBC QN AUTO: 30.9 PG (ref 26.5–33)
MCHC RBC AUTO-ENTMCNC: 31.6 G/DL (ref 31.5–36.5)
MCV RBC AUTO: 98 FL (ref 78–100)
MONOCYTES # BLD AUTO: 0.6 10E3/UL (ref 0–1.3)
MONOCYTES NFR BLD AUTO: 7 %
NEUTROPHILS # BLD AUTO: 6.2 10E3/UL (ref 1.6–8.3)
NEUTROPHILS NFR BLD AUTO: 70 %
PLATELET # BLD AUTO: 249 10E3/UL (ref 150–450)
POTASSIUM SERPL-SCNC: 4.3 MMOL/L (ref 3.4–5.3)
RBC # BLD AUTO: 4.44 10E6/UL (ref 3.8–5.2)
SODIUM SERPL-SCNC: 142 MMOL/L (ref 136–145)
TSH SERPL DL<=0.005 MIU/L-ACNC: 3.18 UIU/ML (ref 0.3–4.2)
WBC # BLD AUTO: 8.8 10E3/UL (ref 4–11)

## 2023-08-11 PROCEDURE — 85025 COMPLETE CBC W/AUTO DIFF WBC: CPT | Performed by: PHYSICIAN ASSISTANT

## 2023-08-11 PROCEDURE — 84443 ASSAY THYROID STIM HORMONE: CPT | Performed by: PHYSICIAN ASSISTANT

## 2023-08-11 PROCEDURE — 99213 OFFICE O/P EST LOW 20 MIN: CPT | Performed by: PHYSICIAN ASSISTANT

## 2023-08-11 PROCEDURE — 36415 COLL VENOUS BLD VENIPUNCTURE: CPT | Performed by: PHYSICIAN ASSISTANT

## 2023-08-11 PROCEDURE — 80048 BASIC METABOLIC PNL TOTAL CA: CPT | Performed by: PHYSICIAN ASSISTANT

## 2023-08-11 RX ORDER — CLINDAMYCIN HCL 300 MG
300 CAPSULE ORAL 3 TIMES DAILY
Qty: 21 CAPSULE | Refills: 0 | Status: SHIPPED | OUTPATIENT
Start: 2023-08-11 | End: 2023-08-18

## 2023-08-11 NOTE — PROGRESS NOTES
Assessment & Plan     Lymphadenopathy  This appears to be reactive lymphadenopathy, secondary to dental abscess most likely.  She has plans for dental extraction but scheduled out several weeks.  Will cover with clindamycin in the interim.  Warm salt water gargles.  Follow-up with dentist.  If discomfort in the neck and adenopathy persist after abx tx and dental abscess resolution should be follow-up with pcp, or 3 week recheck.  Pt agreeable.    - CBC with platelets and differential  - CBC with platelets and differential    Dental abscess  As above    - clindamycin (CLEOCIN) 300 MG capsule  Dispense: 21 capsule; Refill: 0    Thyroid function study abnormality  Has hx of abnl thyroid function in 2020  will recheck.  I do not feel thyroid nodule or thyromegaly, not likely cause of her ST, however if abnl function will refer back to pcp for management.    - TSH with free T4 reflex  - TSH with free T4 reflex         {    Noreen Daniel PA-C  [facility]    Brad Alexander is a 58 year old female who presents to clinic today for the following health issues:  Chief Complaint   Patient presents with    Throat Problem     HAS SORE THROAT JUST RT SIDE FOR OVER 3 WEEKS DID SEE DENTIST THIS WEEK  HAD WORK DONE ON A CROWN      HPI    2-3 weeks of R side throat pain, deep, R side of the throat primarily.  Worse with swallowing.  Has a known actively draining dental abscess as well as crown repair/replacement that needs done.  No fevers.    No uri sx.    No known exposure to illness.    Hx of thyroid test abnl in 2020 with normal TSH but elevated T4. Referred to endocrinology and due to covid 19 appt not completed.       Review of Systems  Constitutional, HEENT, cardiovascular, pulmonary, gi and gu systems are negative, except as otherwise noted.      Objective    /73   Pulse 63   Temp 97.5  F (36.4  C) (Oral)   Resp 16   LMP 07/21/2021   SpO2 97%   Physical Exam   Pt is in  no acute distress and appears well  Ears patent B:  TM s intact, non-injected. All land marks easily visibile    Nasal mucosa is non-edematous, no discharge.    Pharynx: non erythematous, tonsils non hypertrophied, No exudate.  R mandibular erythema, purulent discharge from under 1st molar crown, with gingival mucosa.    Neck supple: tender R anterior cervical adenopathy, small not tender anterior cervical lymphnode on the L.    Lungs: CTA  Heart: RRR, no murmur, no thrills or heaves   Ext: no edema  Skin: no rashes              Results for orders placed or performed in visit on 08/11/23   TSH with free T4 reflex     Status: Normal   Result Value Ref Range    TSH 3.18 0.30 - 4.20 uIU/mL   CBC with platelets and differential     Status: None   Result Value Ref Range    WBC Count 8.8 4.0 - 11.0 10e3/uL    RBC Count 4.44 3.80 - 5.20 10e6/uL    Hemoglobin 13.7 11.7 - 15.7 g/dL    Hematocrit 43.3 35.0 - 47.0 %    MCV 98 78 - 100 fL    MCH 30.9 26.5 - 33.0 pg    MCHC 31.6 31.5 - 36.5 g/dL    RDW 12.9 10.0 - 15.0 %    Platelet Count 249 150 - 450 10e3/uL    % Neutrophils 70 %    % Lymphocytes 19 %    % Monocytes 7 %    % Eosinophils 4 %    % Basophils 0 %    % Immature Granulocytes 0 %    Absolute Neutrophils 6.2 1.6 - 8.3 10e3/uL    Absolute Lymphocytes 1.6 0.8 - 5.3 10e3/uL    Absolute Monocytes 0.6 0.0 - 1.3 10e3/uL    Absolute Eosinophils 0.3 0.0 - 0.7 10e3/uL    Absolute Basophils 0.0 0.0 - 0.2 10e3/uL    Absolute Immature Granulocytes 0.0 <=0.4 10e3/uL   Basic metabolic panel     Status: Abnormal   Result Value Ref Range    Sodium 142 136 - 145 mmol/L    Potassium 4.3 3.4 - 5.3 mmol/L    Chloride 106 98 - 107 mmol/L    Carbon Dioxide (CO2) 24 22 - 29 mmol/L    Anion Gap 12 7 - 15 mmol/L    Urea Nitrogen 38.4 (H) 6.0 - 20.0 mg/dL    Creatinine 1.21 (H) 0.51 - 0.95 mg/dL    Calcium 9.7 8.6 - 10.0 mg/dL    Glucose 77 70 - 99 mg/dL    GFR Estimate 52 (L) >60 mL/min/1.73m2   CBC with platelets and differential     Status:  None    Narrative    The following orders were created for panel order CBC with platelets and differential.  Procedure                               Abnormality         Status                     ---------                               -----------         ------                     CBC with platelets and d...[[id number]]                      Final result                 Please view results for these tests on the individual orders.

## 2024-01-17 ENCOUNTER — NURSE TRIAGE (OUTPATIENT)
Dept: NURSING | Facility: CLINIC | Age: 59
End: 2024-01-17
Payer: COMMERCIAL

## 2024-01-17 ENCOUNTER — E-VISIT (OUTPATIENT)
Dept: URGENT CARE | Facility: CLINIC | Age: 59
End: 2024-01-17
Payer: COMMERCIAL

## 2024-01-17 DIAGNOSIS — R35.0 URINARY FREQUENCY: Primary | ICD-10-CM

## 2024-01-17 PROCEDURE — 99421 OL DIG E/M SVC 5-10 MIN: CPT | Performed by: PHYSICIAN ASSISTANT

## 2024-01-17 NOTE — PATIENT INSTRUCTIONS
Dear Catherine Marte,     After reviewing your responses, I would like you to come in for a urine test to make sure we treat you correctly. This urine test is to evaluate you for a possible urinary tract infection, and should be scheduled for today or tomorrow. Schedule a Lab Only appointment here.     Lab appointments are not available at most locations on the weekends. If no Lab Only appointment is available, you should be seen in any of our convenient Walk-in or Urgent Care Centers, which can be found on our website here.     You will receive instructions with your results in [employer] once they are available.     If your symptoms worsen, you develop pain in your back or stomach, develop fevers, or are not improving in 5 days, please contact your primary care provider for an appointment or visit a Walk-in or Urgent Care Center to be seen.     Thanks again for choosing us as your health care partner,     Oxana Matthews PA-C

## 2024-01-17 NOTE — TELEPHONE ENCOUNTER
Nurse Triage SBAR    Is this a 2nd Level Triage? NO    Situation: Patient calling with concerns of a possible UTI.  Consent: not needed    Background: Symptoms of UTI - starting last night. Patient has had to urinate more frequently and is going very small amounts.    Assessment:   Frequent urination  Going small amounts   No burning  Abdominal bloating  No blood in urine    Protocol Recommended Disposition:   See in Office Today    Recommendation: Advised patient to make an appointment. If no appointments available, go to urgent care or walk-in-clinic. Patient could also try an evisit. Care advice given including when to call back. Patient verbalized understanding and agreed with plan.     Joslyn Lemus RN [facility] 1/17/2024 1:30 PM    Reason for Disposition   Urinating more frequently than usual (i.e., frequency)    Additional Information   Negative: Shock suspected (e.g., cold/pale/clammy skin, too weak to stand, low BP, rapid pulse)   Negative: Sounds like a life-threatening emergency to the triager   Negative: Followed a female genital area injury (e.g., labia, vagina, vulva)   Negative: Followed a male genital area injury (penis, scrotum)   Negative: Vaginal discharge   Negative: Pus (white, yellow) or bloody discharge from end of penis   Negative: Pain or burning with passing urine (urination) and pregnant   Negative: Pain or burning with passing urine (urination) and female   Negative: Pain or burning with passing urine (urination) and male   Negative: Pain or itching in the vulvar area   Negative: Pain in scrotum is main symptom   Negative: Blood in the urine is main symptom   Negative: Symptoms arising from use of a urinary catheter (e.g., Coude, Moreau)   Negative: Unable to urinate (or only a few drops) > 4 hours and bladder feels very full (e.g., palpable bladder or strong urge to urinate)   Negative: Decreased urination and drinking very little and dehydration suspected (e.g., dark  urine, no urine > 12 hours, very dry mouth, very lightheaded)   Negative: Patient sounds very sick or weak to the triager   Negative: Fever > 100.4 F  (38.0 C)   Negative: Side (flank) or lower back pain present   Negative: Bad or foul-smelling urine    Protocols used: Urinary Symptoms-A-OH

## 2024-02-26 ENCOUNTER — OFFICE VISIT (OUTPATIENT)
Dept: FAMILY MEDICINE | Facility: CLINIC | Age: 59
End: 2024-02-26
Payer: COMMERCIAL

## 2024-02-26 VITALS
RESPIRATION RATE: 16 BRPM | SYSTOLIC BLOOD PRESSURE: 109 MMHG | DIASTOLIC BLOOD PRESSURE: 71 MMHG | TEMPERATURE: 98.2 F | HEART RATE: 76 BPM | OXYGEN SATURATION: 97 % | WEIGHT: 119 LBS | HEIGHT: 64 IN | BODY MASS INDEX: 20.32 KG/M2

## 2024-02-26 DIAGNOSIS — M12.9 ARTHRITIS/ARTHROPATHY OF MULTIPLE JOINTS: Chronic | ICD-10-CM

## 2024-02-26 DIAGNOSIS — Z23 IMMUNIZATION DUE: ICD-10-CM

## 2024-02-26 DIAGNOSIS — Z12.4 SCREENING FOR CERVICAL CANCER: ICD-10-CM

## 2024-02-26 DIAGNOSIS — E78.5 HYPERLIPIDEMIA LDL GOAL <130: ICD-10-CM

## 2024-02-26 DIAGNOSIS — K44.9 HIATAL HERNIA: ICD-10-CM

## 2024-02-26 DIAGNOSIS — F30.9 BIPOLAR I DISORDER, SINGLE MANIC EPISODE (H): Chronic | ICD-10-CM

## 2024-02-26 DIAGNOSIS — K20.80 ESOPHAGITIS, LOS ANGELES GRADE D: ICD-10-CM

## 2024-02-26 DIAGNOSIS — Z00.00 ENCOUNTER FOR MEDICARE ANNUAL WELLNESS EXAM: Primary | ICD-10-CM

## 2024-02-26 DIAGNOSIS — N18.31 STAGE 3A CHRONIC KIDNEY DISEASE (H): Chronic | ICD-10-CM

## 2024-02-26 DIAGNOSIS — R41.3 POOR SHORT TERM MEMORY: ICD-10-CM

## 2024-02-26 PROBLEM — Z79.890 HORMONE REPLACEMENT THERAPY (HRT): Chronic | Status: RESOLVED | Noted: 2020-08-10 | Resolved: 2024-02-26

## 2024-02-26 LAB
ALBUMIN SERPL BCG-MCNC: 4.4 G/DL (ref 3.5–5.2)
ALP SERPL-CCNC: 88 U/L (ref 40–150)
ALT SERPL W P-5'-P-CCNC: 21 U/L (ref 0–50)
ANION GAP SERPL CALCULATED.3IONS-SCNC: 11 MMOL/L (ref 7–15)
AST SERPL W P-5'-P-CCNC: 32 U/L (ref 0–45)
BILIRUB SERPL-MCNC: 0.2 MG/DL
BUN SERPL-MCNC: 53.4 MG/DL (ref 6–20)
CALCIUM SERPL-MCNC: 9 MG/DL (ref 8.6–10)
CHLORIDE SERPL-SCNC: 107 MMOL/L (ref 98–107)
CHOLEST SERPL-MCNC: 207 MG/DL
CREAT SERPL-MCNC: 1.2 MG/DL (ref 0.51–0.95)
CREAT UR-MCNC: 58.4 MG/DL
DEPRECATED HCO3 PLAS-SCNC: 22 MMOL/L (ref 22–29)
EGFRCR SERPLBLD CKD-EPI 2021: 52 ML/MIN/1.73M2
FASTING STATUS PATIENT QL REPORTED: NO
GLUCOSE SERPL-MCNC: 85 MG/DL (ref 70–99)
HDLC SERPL-MCNC: 55 MG/DL
HGB BLD-MCNC: 13.1 G/DL (ref 11.7–15.7)
LDLC SERPL CALC-MCNC: 120 MG/DL
MICROALBUMIN UR-MCNC: <12 MG/L
MICROALBUMIN/CREAT UR: NORMAL MG/G{CREAT}
NONHDLC SERPL-MCNC: 152 MG/DL
POTASSIUM SERPL-SCNC: 4.5 MMOL/L (ref 3.4–5.3)
PROT SERPL-MCNC: 6.6 G/DL (ref 6.4–8.3)
SODIUM SERPL-SCNC: 140 MMOL/L (ref 135–145)
TRIGL SERPL-MCNC: 158 MG/DL

## 2024-02-26 PROCEDURE — 80061 LIPID PANEL: CPT | Performed by: FAMILY MEDICINE

## 2024-02-26 PROCEDURE — 82043 UR ALBUMIN QUANTITATIVE: CPT | Performed by: FAMILY MEDICINE

## 2024-02-26 PROCEDURE — 82570 ASSAY OF URINE CREATININE: CPT | Performed by: FAMILY MEDICINE

## 2024-02-26 PROCEDURE — 99214 OFFICE O/P EST MOD 30 MIN: CPT | Mod: 25 | Performed by: FAMILY MEDICINE

## 2024-02-26 PROCEDURE — 85018 HEMOGLOBIN: CPT | Performed by: FAMILY MEDICINE

## 2024-02-26 PROCEDURE — G0145 SCR C/V CYTO,THINLAYER,RESCR: HCPCS | Performed by: FAMILY MEDICINE

## 2024-02-26 PROCEDURE — G0438 PPPS, INITIAL VISIT: HCPCS | Performed by: FAMILY MEDICINE

## 2024-02-26 PROCEDURE — 36415 COLL VENOUS BLD VENIPUNCTURE: CPT | Performed by: FAMILY MEDICINE

## 2024-02-26 PROCEDURE — 80053 COMPREHEN METABOLIC PANEL: CPT | Performed by: FAMILY MEDICINE

## 2024-02-26 PROCEDURE — 91320 SARSCV2 VAC 30MCG TRS-SUC IM: CPT | Performed by: FAMILY MEDICINE

## 2024-02-26 PROCEDURE — 90480 ADMN SARSCOV2 VAC 1/ONLY CMP: CPT | Performed by: FAMILY MEDICINE

## 2024-02-26 PROCEDURE — 87624 HPV HI-RISK TYP POOLED RSLT: CPT | Performed by: FAMILY MEDICINE

## 2024-02-26 RX ORDER — SUCRALFATE 1 G/1
1 TABLET ORAL 2 TIMES DAILY PRN
Qty: 60 TABLET | Refills: 3 | Status: SHIPPED | OUTPATIENT
Start: 2024-02-26

## 2024-02-26 RX ORDER — DEXTROAMPHETAMINE SACCHARATE, AMPHETAMINE ASPARTATE, DEXTROAMPHETAMINE SULFATE AND AMPHETAMINE SULFATE 7.5; 7.5; 7.5; 7.5 MG/1; MG/1; MG/1; MG/1
30 TABLET ORAL
COMMUNITY
Start: 2024-01-05

## 2024-02-26 RX ORDER — DEXTROAMPHETAMINE SACCHARATE, AMPHETAMINE ASPARTATE, DEXTROAMPHETAMINE SULFATE AND AMPHETAMINE SULFATE 5; 5; 5; 5 MG/1; MG/1; MG/1; MG/1
30 TABLET ORAL
COMMUNITY
Start: 2023-12-06

## 2024-02-26 SDOH — HEALTH STABILITY: PHYSICAL HEALTH: ON AVERAGE, HOW MANY DAYS PER WEEK DO YOU ENGAGE IN MODERATE TO STRENUOUS EXERCISE (LIKE A BRISK WALK)?: 6 DAYS

## 2024-02-26 ASSESSMENT — ANXIETY QUESTIONNAIRES
GAD7 TOTAL SCORE: 15
7. FEELING AFRAID AS IF SOMETHING AWFUL MIGHT HAPPEN: SEVERAL DAYS
7. FEELING AFRAID AS IF SOMETHING AWFUL MIGHT HAPPEN: SEVERAL DAYS
GAD7 TOTAL SCORE: 15
6. BECOMING EASILY ANNOYED OR IRRITABLE: NEARLY EVERY DAY
5. BEING SO RESTLESS THAT IT IS HARD TO SIT STILL: NEARLY EVERY DAY
1. FEELING NERVOUS, ANXIOUS, OR ON EDGE: NEARLY EVERY DAY
4. TROUBLE RELAXING: NEARLY EVERY DAY
8. IF YOU CHECKED OFF ANY PROBLEMS, HOW DIFFICULT HAVE THESE MADE IT FOR YOU TO DO YOUR WORK, TAKE CARE OF THINGS AT HOME, OR GET ALONG WITH OTHER PEOPLE?: VERY DIFFICULT
IF YOU CHECKED OFF ANY PROBLEMS ON THIS QUESTIONNAIRE, HOW DIFFICULT HAVE THESE PROBLEMS MADE IT FOR YOU TO DO YOUR WORK, TAKE CARE OF THINGS AT HOME, OR GET ALONG WITH OTHER PEOPLE: VERY DIFFICULT
GAD7 TOTAL SCORE: 15
3. WORRYING TOO MUCH ABOUT DIFFERENT THINGS: SEVERAL DAYS
2. NOT BEING ABLE TO STOP OR CONTROL WORRYING: SEVERAL DAYS

## 2024-02-26 ASSESSMENT — PAIN SCALES - GENERAL: PAINLEVEL: NO PAIN (0)

## 2024-02-26 ASSESSMENT — PATIENT HEALTH QUESTIONNAIRE - PHQ9
10. IF YOU CHECKED OFF ANY PROBLEMS, HOW DIFFICULT HAVE THESE PROBLEMS MADE IT FOR YOU TO DO YOUR WORK, TAKE CARE OF THINGS AT HOME, OR GET ALONG WITH OTHER PEOPLE: SOMEWHAT DIFFICULT
SUM OF ALL RESPONSES TO PHQ QUESTIONS 1-9: 9
SUM OF ALL RESPONSES TO PHQ QUESTIONS 1-9: 9

## 2024-02-26 ASSESSMENT — SOCIAL DETERMINANTS OF HEALTH (SDOH): HOW OFTEN DO YOU GET TOGETHER WITH FRIENDS OR RELATIVES?: THREE TIMES A WEEK

## 2024-02-26 NOTE — LETTER
February 29, 2024      Catherine J Estuardo  [address] [facility] [place] [address]        Dear ,    We are writing to inform you of your test results.    Your test results fall within the expected range(s) or remain unchanged from previous results.  Please continue with current treatment plan.  See you in a year.    Resulted Orders   Pap screen with HPV - recommended age 30 - 65 years   Result Value Ref Range    Interpretation        Negative for Intraepithelial Lesion or Malignancy (NILM)    Comment         Papanicolaou Test Limitations:  Cervical cytology is a screening test with limited sensitivity, and regular screening is critical for cancer prevention.  Pap tests are primarily effective for the diagnosis/prevention of squamous cell carcinoma, not adenocarcinoma or other cancers.        Specimen Adequacy       Satisfactory for evaluation, endocervical/transformation zone component present    Clinical Information       post-menopausal      Reflex Testing Yes regardless of result     Previous Abnormal?       No      Performing Labs       The technical component of this testing was completed at [facility]     Comprehensive metabolic panel (BMP + Alb, Alk Phos, ALT, AST, Total. Bili, TP)   Result Value Ref Range    Sodium 140 135 - 145 mmol/L      Comment:      Reference intervals for this test were updated on 09/26/2023 to more accurately reflect our healthy population. There may be differences in the flagging of prior results with similar values performed with this method. Interpretation of those prior results can be made in the context of the updated reference intervals.     Potassium 4.5 3.4 - 5.3 mmol/L    Carbon Dioxide (CO2) 22 22 - 29 mmol/L    Anion Gap 11 7 - 15 mmol/L    Urea Nitrogen 53.4 (H) 6.0 - 20.0 mg/dL    Creatinine 1.20 (H) 0.51 - 0.95 mg/dL    GFR Estimate 52 (L) >60 mL/min/1.73m2    Calcium 9.0 8.6 - 10.0 mg/dL     Chloride 107 98 - 107 mmol/L    Glucose 85 70 - 99 mg/dL    Alkaline Phosphatase 88 40 - 150 U/L      Comment:      Reference intervals for this test were updated on 11/14/2023 to more accurately reflect our healthy population. There may be differences in the flagging of prior results with similar values performed with this method. Interpretation of those prior results can be made in the context of the updated reference intervals.    AST 32 0 - 45 U/L      Comment:      Reference intervals for this test were updated on 6/12/2023 to more accurately reflect our healthy population. There may be differences in the flagging of prior results with similar values performed with this method. Interpretation of those prior results can be made in the context of the updated reference intervals.    ALT 21 0 - 50 U/L      Comment:      Reference intervals for this test were updated on 6/12/2023 to more accurately reflect our healthy population. There may be differences in the flagging of prior results with similar values performed with this method. Interpretation of those prior results can be made in the context of the updated reference intervals.      Protein Total 6.6 6.4 - 8.3 g/dL    Albumin 4.4 3.5 - 5.2 g/dL    Bilirubin Total 0.2 <=1.2 mg/dL   Lipid panel reflex to direct LDL Non-fasting   Result Value Ref Range    Cholesterol 207 (H) <200 mg/dL    Triglycerides 158 (H) <150 mg/dL    Direct Measure HDL 55 >=50 mg/dL    LDL Cholesterol Calculated 120 (H) <=100 mg/dL    Non HDL Cholesterol 152 (H) <130 mg/dL    Patient Fasting > 8hrs? No     Narrative    Cholesterol  Desirable:  <200 mg/dL    Triglycerides  Normal:  Less than 150 mg/dL  Borderline High:  150-199 mg/dL  High:  200-499 mg/dL  Very High:  Greater than or equal to 500 mg/dL    Direct Measure HDL  Female:  Greater than or equal to 50 mg/dL   Male:  Greater than or equal to 40 mg/dL    LDL Cholesterol  Desirable:  <100mg/dL  Above Desirable:  100-129 mg/dL    Borderline High:  130-159 mg/dL   High:  160-189 mg/dL   Very High:  >= 190 mg/dL    Non HDL Cholesterol  Desirable:  130 mg/dL  Above Desirable:  130-159 mg/dL  Borderline High:  160-189 mg/dL  High:  190-219 mg/dL  Very High:  Greater than or equal to 220 mg/dL   Hemoglobin   Result Value Ref Range    Hemoglobin 13.1 11.7 - 15.7 g/dL   Albumin Random Urine Quantitative with Creat Ratio   Result Value Ref Range    Creatinine Urine mg/dL 58.4 mg/dL      Comment:      The reference ranges have not been established in urine creatinine. The results should be integrated into the clinical context for interpretation.    Albumin Urine mg/L <12.0 mg/L      Comment:      The reference ranges have not been established in urine albumin. The results should be integrated into the clinical context for interpretation.    Albumin Urine mg/g Cr        Comment:      Unable to calculate, urine albumin and/or urine creatinine is outside detectable limits.  Microalbuminuria is defined as an albumin:creatinine ratio of 17 to 299 for males and 25 to 299 for females. A ratio of albumin:creatinine of 300 or higher is indicative of overt proteinuria.  Due to biologic variability, positive results should be confirmed by a second, first-morning random or 24-hour timed urine specimen. If there is discrepancy, a third specimen is recommended. When 2 out of 3 results are in the microalbuminuria range, this is evidence for incipient nephropathy and warrants increased efforts at glucose control, blood pressure control, and institution of therapy with an angiotensin-converting-enzyme (ACE) inhibitor (if the patient can tolerate it).         If you have any questions or concerns, please call the clinic at the number listed above.       Sincerely,      Marychuy Marion MD

## 2024-02-26 NOTE — PATIENT INSTRUCTIONS
Preventive Care Advice   This is general advice given by our system to help you stay healthy. However, your care team may have specific advice just for you. Please talk to your care team about your preventive care needs.  Nutrition  Eat 5 or more servings of fruits and vegetables each day.  Try wheat bread, brown rice and whole grain pasta (instead of white bread, rice, and pasta).  Get enough calcium and vitamin D. Check the label on foods and aim for 100% of the RDA (recommended daily allowance).  Lifestyle  Exercise at least 150 minutes each week  (30 minutes a day, 5 days a week).  Do muscle strengthening activities 2 days a week. These help control your weight and prevent disease.  No smoking.  Wear sunscreen to prevent skin cancer.  Have a dental exam and cleaning every 6 months.  Yearly exams  See your health care team every year to talk about:  Any changes in your health.  Any medicines your care team has prescribed.  Preventive care, family planning, and ways to prevent chronic diseases.  Shots (vaccines)   HPV shots (up to age 26), if you've never had them before.  Hepatitis B shots (up to age 59), if you've never had them before.  COVID-19 shot: Get this shot when it's due.  Flu shot: Get a flu shot every year.  Tetanus shot: Get a tetanus shot every 10 years.  Pneumococcal, hepatitis A, and RSV shots: Ask your care team if you need these based on your risk.  Shingles shot (for age 50 and up)  General health tests  Diabetes screening:  Starting at age 35, Get screened for diabetes at least every 3 years.  If you are younger than age 35, ask your care team if you should be screened for diabetes.  Cholesterol test: At age 39, start having a cholesterol test every 5 years, or more often if advised.  Bone density scan (DEXA): At age 50, ask your care team if you should have this scan for osteoporosis (brittle bones).  Hepatitis C: Get tested at least once in your life.  STIs (sexually transmitted  infections)  Before age 24: Ask your care team if you should be screened for STIs.  After age 24: Get screened for STIs if you're at risk. You are at risk for STIs (including HIV) if:  You are sexually active with more than one person.  You don't use condoms every time.  You or a partner was diagnosed with a sexually transmitted infection.  If you are at risk for HIV, ask about PrEP medicine to prevent HIV.  Get tested for HIV at least once in your life, whether you are at risk for HIV or not.  Cancer screening tests  Cervical cancer screening: If you have a cervix, begin getting regular cervical cancer screening tests starting at age 21.  Breast cancer scan (mammogram): If you've ever had breasts, begin having regular mammograms starting at age 40. This is a scan to check for breast cancer.  Colon cancer screening: It is important to start screening for colon cancer at age 45.  Have a colonoscopy test every 10 years (or more often if you're at risk) Or, ask your provider about stool tests like a FIT test every year or Cologuard test every 3 years.  To learn more about your testing options, visit:   https://www.[email or web address]/987285.pdf.  For help making a decision, visit:   https://bit.ly/yo79522.  Prostate cancer screening test: If you have a prostate, ask your care team if a prostate cancer screening test (PSA) at age 55 is right for you.  Lung cancer screening: If you are a current or former smoker ages 50 to 80, ask your care team if ongoing lung cancer screenings are right for you.  For informational purposes only. Not to replace the advice of your health care provider. Copyright   2023 [employer]. All rights reserved. Clinically reviewed by the [facility] Transitions Program. [employer] [id number] - REV 01/24.
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
## 2024-02-29 LAB
BKR LAB AP GYN ADEQUACY: NORMAL
BKR LAB AP GYN INTERPRETATION: NORMAL
BKR LAB AP HPV REFLEX: NORMAL
BKR LAB AP PREVIOUS ABNORMAL: NORMAL
PATH REPORT.COMMENTS IMP SPEC: NORMAL
PATH REPORT.COMMENTS IMP SPEC: NORMAL
PATH REPORT.RELEVANT HX SPEC: NORMAL

## 2024-03-03 LAB
HUMAN PAPILLOMA VIRUS 16 DNA: NEGATIVE
HUMAN PAPILLOMA VIRUS 18 DNA: NEGATIVE
HUMAN PAPILLOMA VIRUS FINAL DIAGNOSIS: NORMAL
HUMAN PAPILLOMA VIRUS OTHER HR: NEGATIVE

## 2024-03-04 PROBLEM — Z98.890 HISTORY OF LOOP ELECTRICAL EXCISION PROCEDURE (LEEP): Status: ACTIVE | Noted: 2024-03-04

## 2024-03-27 ENCOUNTER — HOSPITAL ENCOUNTER (OUTPATIENT)
Dept: MAMMOGRAPHY | Facility: CLINIC | Age: 59
Discharge: HOME | End: 2024-03-27
Admitting: FAMILY MEDICINE
Payer: COMMERCIAL

## 2024-03-27 DIAGNOSIS — Z12.31 VISIT FOR SCREENING MAMMOGRAM: ICD-10-CM

## 2024-03-27 PROCEDURE — 77063 BREAST TOMOSYNTHESIS BI: CPT

## 2024-04-11 ENCOUNTER — MYC MEDICAL ADVICE (OUTPATIENT)
Dept: PHARMACY | Facility: CLINIC | Age: 59
End: 2024-04-11
Payer: COMMERCIAL

## 2024-04-11 DIAGNOSIS — Z71.84 TRAVEL ADVICE ENCOUNTER: Primary | ICD-10-CM

## 2024-04-11 RX ORDER — DOXYCYCLINE HYCLATE 100 MG
100 TABLET ORAL 2 TIMES DAILY
Qty: 20 TABLET | Refills: 0 | Status: SHIPPED | OUTPATIENT
Start: 2024-04-11

## 2024-04-11 RX ORDER — CIPROFLOXACIN 500 MG/1
500 TABLET, FILM COATED ORAL 2 TIMES DAILY
Qty: 14 TABLET | Refills: 0 | Status: SHIPPED | OUTPATIENT
Start: 2024-04-11 | End: 2024-04-18

## 2024-04-21 ENCOUNTER — OFFICE VISIT (OUTPATIENT)
Dept: FAMILY MEDICINE | Facility: CLINIC | Age: 59
End: 2024-04-21
Payer: COMMERCIAL

## 2024-04-21 VITALS
SYSTOLIC BLOOD PRESSURE: 93 MMHG | TEMPERATURE: 98.3 F | HEART RATE: 64 BPM | DIASTOLIC BLOOD PRESSURE: 61 MMHG | OXYGEN SATURATION: 97 % | RESPIRATION RATE: 12 BRPM

## 2024-04-21 DIAGNOSIS — R10.2 SUPRAPUBIC PAIN: Primary | ICD-10-CM

## 2024-04-21 DIAGNOSIS — A09 TRAVELER'S DIARRHEA: ICD-10-CM

## 2024-04-21 LAB
ALBUMIN UR-MCNC: NEGATIVE MG/DL
AMORPH CRY #/AREA URNS HPF: ABNORMAL /HPF
APPEARANCE UR: ABNORMAL
BACTERIA #/AREA URNS HPF: ABNORMAL /HPF
BILIRUB UR QL STRIP: NEGATIVE
COLOR UR AUTO: YELLOW
GLUCOSE UR STRIP-MCNC: NEGATIVE MG/DL
HGB UR QL STRIP: ABNORMAL
KETONES UR STRIP-MCNC: NEGATIVE MG/DL
LEUKOCYTE ESTERASE UR QL STRIP: ABNORMAL
NITRATE UR QL: NEGATIVE
PH UR STRIP: 8 [PH] (ref 5–8)
RBC #/AREA URNS AUTO: ABNORMAL /HPF
SP GR UR STRIP: 1.02 (ref 1–1.03)
SQUAMOUS #/AREA URNS AUTO: ABNORMAL /LPF
UROBILINOGEN UR STRIP-ACNC: 0.2 E.U./DL
WBC #/AREA URNS AUTO: ABNORMAL /HPF

## 2024-04-21 PROCEDURE — 99213 OFFICE O/P EST LOW 20 MIN: CPT | Performed by: NURSE PRACTITIONER

## 2024-04-21 PROCEDURE — 81001 URINALYSIS AUTO W/SCOPE: CPT | Performed by: NURSE PRACTITIONER

## 2024-04-21 ASSESSMENT — ENCOUNTER SYMPTOMS
FATIGUE: 1
CHILLS: 0
VOMITING: 0
FEVER: 0
DYSURIA: 0

## 2024-04-21 NOTE — PROGRESS NOTES
Assessment & Plan     Suprapubic pain    - UA Macroscopic with reflex to Microscopic and Culture - Clinic Collect  - UA Microscopic with Reflex to Culture    Traveler's diarrhea         Patient with diarrhea associated with traveling to [place] and having a very active weekend at her son's wedding.  Seems to have resolved with Imodium and Pepto-Bismol.  Lasted for about 24 hours total.  Has not been present for the last couple of days.    However, she has been very fatigued and slept 17 hours after coming back from [place].  Does have minimal lower abdominal discomfort associated with lower back discomfort.  No CVA tenderness.  No dysuria.    No shaking chills or fevers.    Screening UA with no clear sign of UTI.    Nontender over the McBurney's point or left lower quadrant.    Probably residual discomfort from traveler's diarrhea.     Advised the following-  Push fluids.      Slowly resume your normal diet.  Try to avoid dairy products until your pain is better.     Okay for Tylenol if needed for discomfort.    If your pain is in 1 spot and seems to be worsening or develop new symptoms such as fever, recommend coming back before your [place] trip.            Return in about 2 days (around 4/23/2024) for If no better.    Senia Moreira, CNP  [facility]    Brad Alexander is a 58 year old female who presents to clinic today for the following health issues:  Chief Complaint   Patient presents with    Diarrhea     Fatigue, and low back pain. Symptoms started Thursday while in [place].      HPI    In [place] for son's wedding.     Diarrhea starting 3 nights ago while in [place].  Taking imodium and Pepto Bismol  No BM x 2 days.    2 days ago, starting have lower abd and back discomfort.  Mild.  Achy.      Chills, fatigue.      Poor appetite.      17 hours of sleep after getting back.  Feels a little bit better with fatigue, but still feeling tired despite long episodes of  sleeping.    Leaving in 3 days for [place].      Denies dysuria.        Review of Systems   Constitutional:  Positive for fatigue. Negative for chills and fever.   Gastrointestinal:  Negative for vomiting.   Genitourinary:  Negative for dysuria.           Objective    BP 93/61   Pulse 64   Temp 98.3  F (36.8  C) (Oral)   Resp 12   LMP 07/21/2021   SpO2 97%   Physical Exam  Constitutional:       Appearance: Normal appearance.   Eyes:      Conjunctiva/sclera: Conjunctivae normal.   Cardiovascular:      Pulses: Normal pulses.   Pulmonary:      Effort: Pulmonary effort is normal.   Abdominal:      Tenderness: There is abdominal tenderness (Suprapubic, just slightly to the left of midline.).   Musculoskeletal:         General: Tenderness (Across lower back) present.   Neurological:      Mental Status: She is alert.   Psychiatric:         Mood and Affect: Mood normal.            Results for orders placed or performed in visit on 04/21/24 (from the past 24 hour(s))   UA Macroscopic with reflex to Microscopic and Culture - Clinic Collect    Specimen: Urine, Clean Catch   Result Value Ref Range    Color Urine Yellow Colorless, Straw, Light Yellow, Yellow    Appearance Urine Slightly Cloudy (A) Clear    Glucose Urine Negative Negative mg/dL    Bilirubin Urine Negative Negative    Ketones Urine Negative Negative mg/dL    Specific Gravity Urine 1.020 1.005 - 1.030    Blood Urine Trace (A) Negative    pH Urine 8.0 5.0 - 8.0    Protein Albumin Urine Negative Negative mg/dL    Urobilinogen Urine 0.2 0.2, 1.0 E.U./dL    Nitrite Urine Negative Negative    Leukocyte Esterase Urine Small (A) Negative   UA Microscopic with Reflex to Culture   Result Value Ref Range    Bacteria Urine Few (A) None Seen /HPF    RBC Urine 0-2 0-2 /HPF /HPF    WBC Urine 5-10 (A) 0-5 /HPF /HPF    Squamous Epithelials Urine Few (A) None Seen /LPF    Amorphous Crystals Urine Moderate (A) None Seen /HPF    Narrative    Urine Culture not indicated

## 2024-04-21 NOTE — PATIENT INSTRUCTIONS
Push fluids.  Your tongue looked a little dry today.    Slowly resume your normal diet.  Try to avoid dairy products until your pain is better.     Okay for Tylenol if needed for discomfort.    If your pain is in 1 spot and seems to be worsening or develop new symptoms such as fever, recommend coming back before your [place] trip.

## 2024-04-22 ENCOUNTER — TELEPHONE (OUTPATIENT)
Dept: FAMILY MEDICINE | Facility: CLINIC | Age: 59
End: 2024-04-22
Payer: COMMERCIAL

## 2024-04-22 NOTE — TELEPHONE ENCOUNTER
Reason for Call:  Appointment Request    Patient requesting this type of appt:  PAIN IN LOWER BACK AND ABDOMEN- GOING OUT OF THE COUNTRY ON WEDNESDAY    Requested provider: Marychuy Marion    Reason patient unable to be scheduled: Not within requested timeframe    When does patient want to be seen/preferred time: 1-2 days    Comments: any provider at [facility]    Could we send this information to you in [facility] or would you prefer to receive a phone call?:   Patient would prefer a phone call   Okay to leave a detailed message?: Yes at Home number on file [phone] (home)    Call taken on 4/22/2024 at 3:13 PM by Haydee LINDSEY

## 2024-04-23 ENCOUNTER — OFFICE VISIT (OUTPATIENT)
Dept: FAMILY MEDICINE | Facility: CLINIC | Age: 59
End: 2024-04-23
Payer: COMMERCIAL

## 2024-04-23 VITALS
RESPIRATION RATE: 16 BRPM | TEMPERATURE: 98.2 F | SYSTOLIC BLOOD PRESSURE: 96 MMHG | HEART RATE: 73 BPM | HEIGHT: 64 IN | WEIGHT: 116 LBS | OXYGEN SATURATION: 97 % | DIASTOLIC BLOOD PRESSURE: 63 MMHG | BODY MASS INDEX: 19.81 KG/M2

## 2024-04-23 DIAGNOSIS — G93.39 OTHER POST INFECTION AND RELATED FATIGUE SYNDROMES: ICD-10-CM

## 2024-04-23 DIAGNOSIS — N18.31 STAGE 3A CHRONIC KIDNEY DISEASE (H): Chronic | ICD-10-CM

## 2024-04-23 DIAGNOSIS — A09 DIARRHEA OF INFECTIOUS ORIGIN: ICD-10-CM

## 2024-04-23 DIAGNOSIS — R79.89 D-DIMER, ELEVATED: ICD-10-CM

## 2024-04-23 DIAGNOSIS — R06.02 SOB (SHORTNESS OF BREATH) ON EXERTION: ICD-10-CM

## 2024-04-23 DIAGNOSIS — R10.2 PELVIC PAIN IN FEMALE: Primary | ICD-10-CM

## 2024-04-23 DIAGNOSIS — M54.50 ACUTE MIDLINE LOW BACK PAIN WITHOUT SCIATICA: ICD-10-CM

## 2024-04-23 LAB
ALBUMIN UR-MCNC: NEGATIVE MG/DL
APPEARANCE UR: CLEAR
BASOPHILS # BLD AUTO: 0 10E3/UL (ref 0–0.2)
BASOPHILS NFR BLD AUTO: 0 %
BILIRUB UR QL STRIP: NEGATIVE
COLOR UR AUTO: YELLOW
D DIMER PPP FEU-MCNC: 0.75 UG/ML FEU (ref 0–0.5)
EOSINOPHIL # BLD AUTO: 0.2 10E3/UL (ref 0–0.7)
EOSINOPHIL NFR BLD AUTO: 4 %
ERYTHROCYTE [DISTWIDTH] IN BLOOD BY AUTOMATED COUNT: 12.6 % (ref 10–15)
ERYTHROCYTE [SEDIMENTATION RATE] IN BLOOD BY WESTERGREN METHOD: 16 MM/HR (ref 0–30)
GLUCOSE UR STRIP-MCNC: NEGATIVE MG/DL
HCT VFR BLD AUTO: 41.4 % (ref 35–47)
HGB BLD-MCNC: 13.5 G/DL (ref 11.7–15.7)
HGB UR QL STRIP: NEGATIVE
IMM GRANULOCYTES # BLD: 0 10E3/UL
IMM GRANULOCYTES NFR BLD: 0 %
KETONES UR STRIP-MCNC: NEGATIVE MG/DL
LEUKOCYTE ESTERASE UR QL STRIP: NEGATIVE
LYMPHOCYTES # BLD AUTO: 0.9 10E3/UL (ref 0.8–5.3)
LYMPHOCYTES NFR BLD AUTO: 20 %
MCH RBC QN AUTO: 30.8 PG (ref 26.5–33)
MCHC RBC AUTO-ENTMCNC: 32.6 G/DL (ref 31.5–36.5)
MCV RBC AUTO: 95 FL (ref 78–100)
MONOCYTES # BLD AUTO: 0.9 10E3/UL (ref 0–1.3)
MONOCYTES NFR BLD AUTO: 20 %
NEUTROPHILS # BLD AUTO: 2.7 10E3/UL (ref 1.6–8.3)
NEUTROPHILS NFR BLD AUTO: 56 %
NITRATE UR QL: NEGATIVE
PH UR STRIP: 6 [PH] (ref 5–8)
PLATELET # BLD AUTO: 187 10E3/UL (ref 150–450)
RBC # BLD AUTO: 4.38 10E6/UL (ref 3.8–5.2)
SP GR UR STRIP: 1.02 (ref 1–1.03)
UROBILINOGEN UR STRIP-ACNC: 0.2 E.U./DL
WBC # BLD AUTO: 4.8 10E3/UL (ref 4–11)

## 2024-04-23 PROCEDURE — 86140 C-REACTIVE PROTEIN: CPT | Performed by: FAMILY MEDICINE

## 2024-04-23 PROCEDURE — 85025 COMPLETE CBC W/AUTO DIFF WBC: CPT | Performed by: FAMILY MEDICINE

## 2024-04-23 PROCEDURE — 85652 RBC SED RATE AUTOMATED: CPT | Performed by: FAMILY MEDICINE

## 2024-04-23 PROCEDURE — 99214 OFFICE O/P EST MOD 30 MIN: CPT | Performed by: FAMILY MEDICINE

## 2024-04-23 PROCEDURE — 81003 URINALYSIS AUTO W/O SCOPE: CPT | Performed by: FAMILY MEDICINE

## 2024-04-23 PROCEDURE — 85379 FIBRIN DEGRADATION QUANT: CPT | Performed by: FAMILY MEDICINE

## 2024-04-23 PROCEDURE — 36415 COLL VENOUS BLD VENIPUNCTURE: CPT | Performed by: FAMILY MEDICINE

## 2024-04-23 PROCEDURE — 80053 COMPREHEN METABOLIC PANEL: CPT | Performed by: FAMILY MEDICINE

## 2024-04-23 RX ORDER — PREDNISONE 10 MG/1
TABLET ORAL
Qty: 30 TABLET | Refills: 0 | Status: SHIPPED | OUTPATIENT
Start: 2024-04-23

## 2024-04-23 RX ORDER — METRONIDAZOLE 500 MG/1
500 TABLET ORAL 2 TIMES DAILY
Qty: 14 TABLET | Refills: 0 | Status: SHIPPED | OUTPATIENT
Start: 2024-04-23 | End: 2024-04-30

## 2024-04-23 ASSESSMENT — PATIENT HEALTH QUESTIONNAIRE - PHQ9
SUM OF ALL RESPONSES TO PHQ QUESTIONS 1-9: 7
SUM OF ALL RESPONSES TO PHQ QUESTIONS 1-9: 7
10. IF YOU CHECKED OFF ANY PROBLEMS, HOW DIFFICULT HAVE THESE PROBLEMS MADE IT FOR YOU TO DO YOUR WORK, TAKE CARE OF THINGS AT HOME, OR GET ALONG WITH OTHER PEOPLE: VERY DIFFICULT

## 2024-04-23 ASSESSMENT — ANXIETY QUESTIONNAIRES
IF YOU CHECKED OFF ANY PROBLEMS ON THIS QUESTIONNAIRE, HOW DIFFICULT HAVE THESE PROBLEMS MADE IT FOR YOU TO DO YOUR WORK, TAKE CARE OF THINGS AT HOME, OR GET ALONG WITH OTHER PEOPLE: SOMEWHAT DIFFICULT
7. FEELING AFRAID AS IF SOMETHING AWFUL MIGHT HAPPEN: SEVERAL DAYS
4. TROUBLE RELAXING: SEVERAL DAYS
3. WORRYING TOO MUCH ABOUT DIFFERENT THINGS: SEVERAL DAYS
GAD7 TOTAL SCORE: 5
2. NOT BEING ABLE TO STOP OR CONTROL WORRYING: SEVERAL DAYS
8. IF YOU CHECKED OFF ANY PROBLEMS, HOW DIFFICULT HAVE THESE MADE IT FOR YOU TO DO YOUR WORK, TAKE CARE OF THINGS AT HOME, OR GET ALONG WITH OTHER PEOPLE?: SOMEWHAT DIFFICULT
GAD7 TOTAL SCORE: 5
5. BEING SO RESTLESS THAT IT IS HARD TO SIT STILL: NOT AT ALL
1. FEELING NERVOUS, ANXIOUS, OR ON EDGE: NOT AT ALL
6. BECOMING EASILY ANNOYED OR IRRITABLE: SEVERAL DAYS
GAD7 TOTAL SCORE: 5
7. FEELING AFRAID AS IF SOMETHING AWFUL MIGHT HAPPEN: SEVERAL DAYS

## 2024-04-23 ASSESSMENT — PAIN SCALES - GENERAL: PAINLEVEL: SEVERE PAIN (7)

## 2024-04-23 NOTE — PROGRESS NOTES
Assessment & Plan     Pelvic pain in female  I do not know if she is having pelvic pain or if she is having back pain or both.  She wants me to check a urine which ended up being completely normal.  - UA Macroscopic with reflex to Microscopic and Culture - Lab Collect  - UA Macroscopic with reflex to Microscopic and Culture - Lab Collect    Acute midline low back pain without sciatica  It is possible that she is having low back pain due to passing crystals that were formed during her dehydration.  They were seen in her urine from 4/21.  It could also be spine related.  I offered to treat her with prednisone which might help her fatigue as well.  - predniSONE (DELTASONE) 10 MG tablet  Dispense: 30 tablet; Refill: 0    Diarrhea of infectious origin  She had a horrible bout of diarrhea and vomiting which I think was viral because everybody in the party except for 1 got sick.  There were viral type symptoms with the sickness.  I am going to give her some Flagyl to bring with her for her trip.  I will check her blood counts and differential.  - CBC with platelets and differential  - metroNIDAZOLE (FLAGYL) 500 MG tablet  Dispense: 14 tablet; Refill: 0  - CBC with platelets and differential    Stage 3a chronic kidney disease (H)  She has had GFR range of 40 to mid 50s.  Will recheck labs and electrolytes.  - Comprehensive metabolic panel (BMP + Alb, Alk Phos, ALT, AST, Total. Bili, TP)  - Comprehensive metabolic panel (BMP + Alb, Alk Phos, ALT, AST, Total. Bili, TP)    SOB (shortness of breath) on exertion  I think this is likely because of her fatigue and the fact that she is just wiped right now.  However, I will do a D-dimer to make sure that there is nothing obvious.  She denies leg pain or swelling.  - D dimer, quantitative  - D dimer, quantitative    Other post infection and related fatigue syndromes  Mostly I think she is just having a hard time getting over a virus of some sort.  Perhaps norovirus.  Will check  some inflammation labs.  - CRP, inflammation  - ESR: Erythrocyte sedimentation rate  - CRP, inflammation  - ESR: Erythrocyte sedimentation rate    D-dimer, elevated  Her D-dimer came back mildly elevated at 0.75.  I think this is low enough to assume it is because of her recent GI infection.  If patient decides to stay home from the [place] leg of her journey, she could get this rechecked.    I will get back to her on her lab results by [employer] and only call with grossly abnormal values.  I think she can go on her trip if she is feeling up to it.  I hope she can at least do the [place] leg of the trip.      FUTURE APPOINTMENTS:       - Follow-up for annual visit or as needed      Subjective   Catherine is a 58 year old, presenting for the following health issues:  Abdominal Pain (Goes around back, just got back from [place] , has had diarrhea, was seen at  and had labs for UTI , no infection, trouble sleeping , vomited twice , not eating, leaving for [place] tomorrow )      4/23/2024    10:46 AM   Additional Questions   Roomed by erasmo mondragon cma     History of Present Illness       Back Pain:  She presents for follow up of back pain. Patient's back pain is a new problem.    Original cause of back pain: not sure  First noticed back pain: in the last week  Patient feels back pain: constantlyLocation of back pain:  Right lower back and left lower back  Description of back pain: dull ache and gnawing  Back pain spreads: nowhere    Since patient first noticed back pain, pain is: gradually worsening  Does back pain interfere with her job:  Yes  On a scale of 1-10 (10 being the worst), patient describes pain as:  7  What makes back pain worse: nothing   Acupuncture: not tried  Acetaminophen: not tried  Activity or exercise: not helpful  Chiropractor:  Not tried  Cold: not helpful  Heat: not tried  Massage: not helpful  Muscle relaxants: not tried  NSAIDS: helpful  Opioids: not tried  Physical Therapy: not tried  Rest: not  helpful  Steroid Injection: not tried  Stretching: not helpful  Surgery: not tried  TENS unit: not tried  Topical pain relievers: not helpful  Other healthcare providers patient is seeing for back pain: None    Reason for visit:  Lower back and abdominal pain  some nausea  Symptom onset:  3-7 days ago  Symptoms include:  Abdominal and low back pain diarrea  Symptom intensity:  Moderate    She eats 2-3 servings of fruits and vegetables daily.She consumes 1 sweetened beverage(s) daily.She exercises with enough effort to increase her heart rate 60 or more minutes per day.  She exercises with enough effort to increase her heart rate 6 days per week.   She is taking medications regularly.     Patient tells me she is having low back and possibly pelvic pain currently.  She was on a vacation/destination wedding of her son's in [place] when they all became ill with travelers diarrhea.  She was very sick while others were not as sick.  She was able to attend the wedding.  When she got home she was seen at the walk-in clinic for her diarrhea and dysuria.  This was on 4/21/2024.  We went over her urine results which showed trace blood, small leukocyte esterase and crystals.  We discussed that I treated her with Cipro and doxycycline in preparation for her trip to [place] but I am not sure she used it.  Apparently 6 out of 7 people got sick even though they felt like they were being very careful.  She even but vomited after being home.  We discussed the crystals could mean she has some very small because she got dehydrated with her sickness.  She is feeling very tired, so tired she is short of breath on exertion.  She does not feel short of breath otherwise.  She just wants to sleep a lot.  We discussed that it sounds like they had a viral disease since antibiotics did not help.  And perhaps colitis was triggered in her?  Problem is she is supposed to be leaving tomorrow on a trip to [place] followed by a trip to [facility].  She is  "not sure she is healthy enough.  We discussed doing some labs.  She has contemplated staying home the leg of the journey to [place] and just do the later [place] part of the trip.      Objective    BP 96/63   Pulse 73   Temp 98.2  F (36.8  C)   Resp 16   Ht 1.613 m (5' 3.5\")   Wt 52.6 kg (116 lb)   LMP 07/21/2021   SpO2 97%   BMI 20.23 kg/m    Body mass index is 20.23 kg/m .  Physical Exam   GENERAL: alert, no distress, fatigued, and looking better than usual even  RESP: lungs clear to auscultation - no rales, rhonchi or wheezes  CV: regular rates and rhythm and without murmur  ABDOMEN: soft, nontender  MS: no gross musculoskeletal defects noted, no edema  PSYCH: mentation appears normal, affect flat, and fatigued    Results for orders placed or performed in visit on 04/23/24   Comprehensive metabolic panel (BMP + Alb, Alk Phos, ALT, AST, Total. Bili, TP)     Status: Abnormal   Result Value Ref Range    Sodium 140 135 - 145 mmol/L    Potassium 4.0 3.4 - 5.3 mmol/L    Carbon Dioxide (CO2) 25 22 - 29 mmol/L    Anion Gap 9 7 - 15 mmol/L    Urea Nitrogen 21.6 (H) 6.0 - 20.0 mg/dL    Creatinine 1.03 (H) 0.51 - 0.95 mg/dL    GFR Estimate 63 >60 mL/min/1.73m2    Calcium 9.1 8.6 - 10.0 mg/dL    Chloride 106 98 - 107 mmol/L    Glucose 111 (H) 70 - 99 mg/dL    Alkaline Phosphatase 90 40 - 150 U/L    AST 23 0 - 45 U/L    ALT 16 0 - 50 U/L    Protein Total 6.7 6.4 - 8.3 g/dL    Albumin 4.2 3.5 - 5.2 g/dL    Bilirubin Total 0.3 <=1.2 mg/dL   UA Macroscopic with reflex to Microscopic and Culture - Lab Collect     Status: Normal    Specimen: Urine, Midstream   Result Value Ref Range    Color Urine Yellow Colorless, Straw, Light Yellow, Yellow    Appearance Urine Clear Clear    Glucose Urine Negative Negative mg/dL    Bilirubin Urine Negative Negative    Ketones Urine Negative Negative mg/dL    Specific Gravity Urine 1.025 1.005 - 1.030    Blood Urine Negative Negative    pH Urine 6.0 5.0 - 8.0    Protein Albumin Urine " Negative Negative mg/dL    Urobilinogen Urine 0.2 0.2, 1.0 E.U./dL    Nitrite Urine Negative Negative    Leukocyte Esterase Urine Negative Negative    Narrative    Microscopic not indicated   D dimer, quantitative     Status: Abnormal   Result Value Ref Range    D-Dimer Quantitative 0.75 (H) 0.00 - 0.50 ug/mL FEU    Narrative    This D-dimer assay is intended for use in conjunction with a clinical pretest probability assessment model to exclude pulmonary embolism (PE) and deep venous thrombosis (DVT) in outpatients suspected of PE or DVT. The cut-off value is 0.50 ug/mL FEU.    For patients 50 years of age or older, the application of age-adjusted cut-off values for D-Dimer may increase the specificity without significant effect on sensitivity. The literature suggested calculation age adjusted cut-off in ug/L = age in years x 10 ug/L. The results in this laboratory are reported as ug/mL rather than ug/L. The calculation for age adjusted cut off in ug/mL= age in years x 0.01 ug/mL. For example, the cut off for a 76 year old male is 76 x 0.01 ug/mL = 0.76 ug/mL (760 ug/L).    M Rogelio et al. Age adjusted D-dimer cut-off levels to rule out pulmonary embolism: The ADJUST-PE Study. JELANI 2014;311:4326-5992.; HJ Myriam et al. Diagnostic accuracy of conventional or age adjusted D-dimer cutoff values in older patients with suspected venous thromboembolism. Systemic review and meta-analysis. BMJ 2013:346:f2492.   CRP, inflammation     Status: Abnormal   Result Value Ref Range    CRP Inflammation 112.00 (H) <5.00 mg/L   ESR: Erythrocyte sedimentation rate     Status: Normal   Result Value Ref Range    Erythrocyte Sedimentation Rate 16 0 - 30 mm/hr   CBC with platelets and differential     Status: None   Result Value Ref Range    WBC Count 4.8 4.0 - 11.0 10e3/uL    RBC Count 4.38 3.80 - 5.20 10e6/uL    Hemoglobin 13.5 11.7 - 15.7 g/dL    Hematocrit 41.4 35.0 - 47.0 %    MCV 95 78 - 100 fL    MCH 30.8 26.5 - 33.0 pg    MCHC  32.6 31.5 - 36.5 g/dL    RDW 12.6 10.0 - 15.0 %    Platelet Count 187 150 - 450 10e3/uL    % Neutrophils 56 %    % Lymphocytes 20 %    % Monocytes 20 %    % Eosinophils 4 %    % Basophils 0 %    % Immature Granulocytes 0 %    Absolute Neutrophils 2.7 1.6 - 8.3 10e3/uL    Absolute Lymphocytes 0.9 0.8 - 5.3 10e3/uL    Absolute Monocytes 0.9 0.0 - 1.3 10e3/uL    Absolute Eosinophils 0.2 0.0 - 0.7 10e3/uL    Absolute Basophils 0.0 0.0 - 0.2 10e3/uL    Absolute Immature Granulocytes 0.0 <=0.4 10e3/uL   CBC with platelets and differential     Status: None    Narrative    The following orders were created for panel order CBC with platelets and differential.  Procedure                               Abnormality         Status                     ---------                               -----------         ------                     CBC with platelets and d...[[id number]]                      Final result                 Please view results for these tests on the individual orders.           Signed Electronically by: Marychuy Marion MD

## 2024-04-24 ENCOUNTER — TELEPHONE (OUTPATIENT)
Dept: FAMILY MEDICINE | Facility: CLINIC | Age: 59
End: 2024-04-24
Payer: COMMERCIAL

## 2024-04-24 LAB
ALBUMIN SERPL BCG-MCNC: 4.2 G/DL (ref 3.5–5.2)
ALP SERPL-CCNC: 90 U/L (ref 40–150)
ALT SERPL W P-5'-P-CCNC: 16 U/L (ref 0–50)
ANION GAP SERPL CALCULATED.3IONS-SCNC: 9 MMOL/L (ref 7–15)
AST SERPL W P-5'-P-CCNC: 23 U/L (ref 0–45)
BILIRUB SERPL-MCNC: 0.3 MG/DL
BUN SERPL-MCNC: 21.6 MG/DL (ref 6–20)
CALCIUM SERPL-MCNC: 9.1 MG/DL (ref 8.6–10)
CHLORIDE SERPL-SCNC: 106 MMOL/L (ref 98–107)
CREAT SERPL-MCNC: 1.03 MG/DL (ref 0.51–0.95)
CRP SERPL-MCNC: 112 MG/L
DEPRECATED HCO3 PLAS-SCNC: 25 MMOL/L (ref 22–29)
EGFRCR SERPLBLD CKD-EPI 2021: 63 ML/MIN/1.73M2
GLUCOSE SERPL-MCNC: 111 MG/DL (ref 70–99)
POTASSIUM SERPL-SCNC: 4 MMOL/L (ref 3.4–5.3)
PROT SERPL-MCNC: 6.7 G/DL (ref 6.4–8.3)
SODIUM SERPL-SCNC: 140 MMOL/L (ref 135–145)

## 2024-04-24 NOTE — TELEPHONE ENCOUNTER
Per Dr. Marion, no major concerns and no need follow-up with patient. She will comment on labs when she returns in clinic tomorrow.     Emiliana Fox RN  [facility]

## 2024-04-24 NOTE — TELEPHONE ENCOUNTER
Shelbie Wing RN called Catherine on 4/24 and left a message to return the call to the clinic.  If patient calls back, please route to [facility].    TC please route to RN.    Patient's D-dimer was slightly elevated.  Please assess for s/s of blood clot and encourage to go in to be evaluated.    DARRICK Singleton RN  [facility]

## 2024-05-04 NOTE — TELEPHONE ENCOUNTER
Rx was filled 6/4/19 with refills. What does she need specifically? If it's a travel problem, she should speak with her pharmacist and see if they can give her options.     H.DeGree, CMA     Opt out

## 2024-06-10 ENCOUNTER — NURSE TRIAGE (OUTPATIENT)
Dept: FAMILY MEDICINE | Facility: CLINIC | Age: 59
End: 2024-06-10
Payer: COMMERCIAL

## 2024-06-10 ENCOUNTER — MYC MEDICAL ADVICE (OUTPATIENT)
Dept: FAMILY MEDICINE | Facility: CLINIC | Age: 59
End: 2024-06-10
Payer: COMMERCIAL

## 2024-06-10 NOTE — TELEPHONE ENCOUNTER
Recommend monitoring for skin rash or other s/s such as fatigue, feer, anorexia, head ache, neck stiffness, myalgias, arthralgias, or lymphadenopathy.

## 2024-06-10 NOTE — TELEPHONE ENCOUNTER
Nurse Triage SBAR    Is this a 2nd Level Triage? NO    Situation: Patient has tick bites    Background: Patient has two tick bites, has had lymes in the past    Assessment: Patient wrote in with two tick bites located in her midriff under the bra line, but above the waist line. She does not have any indicators of lymes, but she is reportedly paranoid as she has had lymes in the past. Patient's  was able to get ticks out quickly before they were engorged, there is no fever or bulleye, and there is only slight irritation at the bite areas.     Protocol Recommended Disposition:   Home Care    Recommendation: Home care, will route to provider per pt request to see if there is anything more to be done.       Does the patient meet one of the following criteria for ADS visit consideration? 16+ years old, with an [facility] PCP     TIP  Providers, please consider if this condition is appropriate for management at one of our Acute and Diagnostic Services sites.     If patient is a good candidate, please use dotphrase <dot>triageresponse and select Refer to ADS to document.  Reason for Disposition   Tick bite with no complications    Additional Information   Negative: Not a tick bite   Negative: Patient sounds very sick or weak to the triager   Negative: Fever or severe headache occurs, 2 to 14 days following the bite   Negative: Widespread rash occurs, 2 to 14 days following the bite   Negative: Can't remove live tick (after using Care Advice)   Negative: Fever and spreading red area or streak   Negative: Fever and area is very tender to touch   Negative: Red streak or red line and length > 2 inches (5 cm)   Negative: Red or very tender (to touch) area and started over 24 hours after the bite   Negative: Red ring or bull's-eye rash occurs around a deer tick bite   Negative: Probable deer tick that was attached > 36 hours (or tick appears swollen, not flat)   Negative: Patient wants to be seen    Answer Assessment - Initial  "Assessment Questions  1. ATTACHED:  \"Is the tick still on the skin?\"  (e.g., yes, no, unsure)      No  2. ONSET - TICK STILL ATTACHED:  \"How long do you think the tick has been on your skin?\" (e.g., hours, days, unsure)  Note:  Is there a recent activity (camping, hiking) where the caller may have been exposed?  3. ONSET - TICK NOT STILL ATTACHED: \"If the tick has been removed, how long do you think the tick was attached before you removed it?\" (e.g., 5 hours, 2 days). \"When was this?\"      Unknown, was in backyard about five hours Friday, Saturday, and Sunday  4. LOCATION: \"Where is the tick bite located?\" (e.g., arm, leg)      Midriff right below bra line and above waist line  5. TYPE of TICK: \"Is it a wood tick or a deer tick?\" (e.g., deer tick, wood tick; unsure)      Deer tick  6. SIZE of TICK: \"How big is the tick?\" (e.g., size of poppy seed, apple seed, watermelon seed; unsure) Note: Deer ticks can be the size of a poppy seed (nymph) or an apple seed (adult).        Smaller than a sesame seed  7. ENGORGED: \"Did the tick look flat or engorged (full, swollen)?\" (e.g., flat, engorged; unsure)      No not engorged  8. OTHER SYMPTOMS: \"Do you have any other symptoms?\" (e.g., fever, rash, redness at bite area, red ring around bite)      Redness at site  9. PREGNANCY: \"Is there any chance you are pregnant?\" \"When was your last menstrual period?\"      No    Protocols used: Tick Bite-A-OH    Gilbert Kendall RN  [facility]    "

## 2024-06-11 NOTE — TELEPHONE ENCOUNTER
Left message to return call. If patient calls back, please route to [facility].     TCs, please send to RNs.    Gilbert Kendall RN  [facility]

## 2024-06-13 NOTE — TELEPHONE ENCOUNTER
Spoke with patient to relay provider message, she verbalized understanding and has no further questions at this time.    Gilbert Kendall RN  [facility]

## 2024-09-30 ENCOUNTER — MYC MEDICAL ADVICE (OUTPATIENT)
Dept: FAMILY MEDICINE | Facility: CLINIC | Age: 59
End: 2024-09-30
Payer: COMMERCIAL

## 2024-09-30 NOTE — TELEPHONE ENCOUNTER
Left message for patient to return call. If patient calls back, please route to RN to for triage.     Noris Snow RN  [facility]

## 2024-10-01 ENCOUNTER — NURSE TRIAGE (OUTPATIENT)
Dept: FAMILY MEDICINE | Facility: CLINIC | Age: 59
End: 2024-10-01
Payer: COMMERCIAL

## 2024-10-01 ENCOUNTER — TRANSFERRED RECORDS (OUTPATIENT)
Dept: HEALTH INFORMATION MANAGEMENT | Facility: CLINIC | Age: 59
End: 2024-10-01

## 2024-10-01 NOTE — TELEPHONE ENCOUNTER
"Nurse Triage SBAR    Is this a 2nd Level Triage? NO    Situation:   Patient calling back regarding [employer] message,  \"I am not usually sick. Started feeling tired and actually had to skip my workouts which I never do. Then turned into cough, wheezing and sob. Not had that before.   Thought it was or is allergies?  Negative covid x3. I am still sleeping 14 hours and tired coughing but not as bad as several days ago.  Still am not able to workout.   Am I dying, ha kidding. Is this going around? Could I get labs drawn? What do you thin?\"    Background:   Sx started around 9/23  Tested for COVID x3 and all negative   Usually exercises and keeps active  - unable to do this due to low energy and SOB     Assessment:   Shortness of breath - comes and goes - worse with exertion  Mild wheezing   Congestion and coughing - yellow phlegm   Did have mild fever last week but has improved     Protocol Recommended Disposition:   Go To Office Now    Recommendation:   Home care advice provided   Recommend patient have evaluation today but no in clinic appointments available. Patient is agreeable to urgent care and will go to [facility] for evaluation.      Does the patient meet one of the following criteria for ADS visit consideration? 16+ years old, with an [facility] PCP     TIP  Providers, please consider if this condition is appropriate for management at one of our Acute and Diagnostic Services sites.     If patient is a good candidate, please use dotphrase <dot>triageresponse and select Refer to ADS to document.    Reason for Disposition   MILD difficulty breathing (e.g., minimal/no SOB at rest, SOB with walking, pulse < 100) of new-onset or worse than normal    Additional Information   Negative: SEVERE difficulty breathing (e.g., struggling for each breath, speaks in single words, pulse > 120)   Negative: Breathing stopped and hasn't returned   Negative: Choking on something   Negative: Bluish (or gray) lips or face   Negative: " "Difficult to awaken or acting confused (e.g., disoriented, slurred speech)   Negative: Passed out (i.e., fainted, collapsed and was not responding)   Negative: Wheezing started suddenly after medicine, an allergic food, or bee sting   Negative: Stridor (harsh sound while breathing in)   Negative: Slow, shallow and weak breathing   Negative: Sounds like a life-threatening emergency to the triager   Negative: MODERATE difficulty breathing (e.g., speaks in phrases, SOB even at rest, pulse 100-120) of new-onset or worse than normal   Negative: Oxygen level (e.g., pulse oximetry) 90% or lower   Negative: Wheezing can be heard across the room   Negative: Drooling or spitting out saliva (because can't swallow)   Negative: Any history of prior \"blood clot\" in leg or lungs   Negative: Illness requiring prolonged bedrest in past month (e.g., immobilization, long hospital stay)   Negative: Hip or leg fracture (broken bone) in past month (or had cast on leg or ankle in past month)   Negative: Major surgery in the past month   Negative: Long-distance travel in past month (e.g., car, bus, train, plane; with trip lasting 6 or more hours)   Negative: Cancer treatment in past six months (or has cancer now)   Negative: Extra heartbeats, irregular heart beating, or heart is beating very fast (i.e., 'palpitations')   Negative: Fever > 103 F (39.4 C)   Negative: Fever > 101 F (38.3 C) and over 60 years of age   Negative: Fever > 100.0 F (37.8 C) and bedridden (e.g., nursing home patient, stroke, chronic illness, recovering from surgery)   Negative: Fever > 100.0 F (37.8 C) and diabetes mellitus or weak immune system (e.g., HIV positive, cancer chemo, splenectomy, organ transplant, chronic steroids)   Negative: Periods where breathing stops and then resumes normally and bedridden (e.g., nursing home patient, CVA)   Negative: Pregnant or postpartum (from 0 to 6 weeks after delivery)   Negative: Patient sounds very sick or weak to the " triager    Protocols used: Breathing Difficulty-A-OH      LEONIDAS HeltonN, RN  [facility]

## 2024-10-10 ENCOUNTER — TRANSFERRED RECORDS (OUTPATIENT)
Dept: HEALTH INFORMATION MANAGEMENT | Facility: CLINIC | Age: 59
End: 2024-10-10
Payer: COMMERCIAL

## 2024-10-28 NOTE — TELEPHONE ENCOUNTER
Pt getting lab for Dr. Cuevas for Creatinine.     I saw that PCP already order a BMP which creatinine will be in there.     No other order needed from Dr. Cuevas at this time.    pt is medically stable. pending emergency housing

## 2024-11-04 ENCOUNTER — TRANSFERRED RECORDS (OUTPATIENT)
Dept: HEALTH INFORMATION MANAGEMENT | Facility: CLINIC | Age: 59
End: 2024-11-04
Payer: COMMERCIAL

## 2024-11-10 ENCOUNTER — MYC MEDICAL ADVICE (OUTPATIENT)
Dept: FAMILY MEDICINE | Facility: CLINIC | Age: 59
End: 2024-11-10
Payer: COMMERCIAL

## 2024-11-10 DIAGNOSIS — K44.9 HIATAL HERNIA: ICD-10-CM

## 2024-11-10 DIAGNOSIS — K20.80 ESOPHAGITIS, LOS ANGELES GRADE D: ICD-10-CM

## 2024-11-11 ENCOUNTER — NURSE TRIAGE (OUTPATIENT)
Dept: FAMILY MEDICINE | Facility: CLINIC | Age: 59
End: 2024-11-11
Payer: COMMERCIAL

## 2024-11-11 DIAGNOSIS — R21 RASH: Primary | ICD-10-CM

## 2024-11-11 RX ORDER — SUCRALFATE 1 G/1
1 TABLET ORAL 2 TIMES DAILY PRN
Qty: 60 TABLET | Refills: 1 | Status: SHIPPED | OUTPATIENT
Start: 2024-11-11

## 2024-11-11 RX ORDER — TRIAMCINOLONE ACETONIDE 1 MG/G
CREAM TOPICAL 2 TIMES DAILY
Qty: 30 G | Refills: 0 | Status: SHIPPED | OUTPATIENT
Start: 2024-11-11

## 2024-11-11 NOTE — TELEPHONE ENCOUNTER
Please see 11/11/24 triage encounter. Also, please see patient medication request, order pended to patient's preferred pharmacy on file.    Gilbert Kendall RN  [facility]

## 2024-11-11 NOTE — TELEPHONE ENCOUNTER
Nurse Triage SBAR    Is this a 2nd Level Triage? YES, LICENSED PRACTITIONER REVIEW IS REQUIRED    Situation: Dry patches all over legs    Background: Patient believes its eczema    Assessment: Patient wrote in stating she has dry patches all over both her legs that are persistently itchy. She has tried OTC cortisone cream to help control this, but it has not helped to this point. She wanted to be evaluated in clinic, but there were no appointments available before she has to leave out of town this week.     She would like to know if there is a prescription cream she can try while she is out of town, or what other alternatives she has to help control these dry spots. Patient has never been diagnosed with eczema in the past, but states her son has and the spots are similar to his.     Protocol Recommended Disposition:   See in Office Today    Recommendation: See in office today, no appointments available and patient would like an office visit rather than an UC visit. Since the patient cannot get in, she is curious if she can get a prescription to help with the itchiness.      Routed to provider    Does the patient meet one of the following criteria for ADS visit consideration? 16+ years old, with an [facility] PCP     TIP  Providers, please consider if this condition is appropriate for management at one of our Acute and Diagnostic Services sites.     If patient is a good candidate, please use dotphrase <dot>triageresponse and select Refer to ADS to document.  Reason for Disposition   Patient wants to be seen    Additional Information   Negative: Sounds like a life-threatening emergency to the triager   Negative: Athlete's Foot suspected (i.e., itchy rash between the toes)   Negative: Insect bite(s) suspected   Negative: Jock Itch suspected (i.e., itchy rash on inner thighs near genital area)   Negative: Localized lump (or swelling) without redness or rash   Negative: MPOX SUSPECTED (e.g., direct skin contact such as sex,  recent travel to [place] or [place]) and any SYMPTOMS OF MPOX (e.g., rash, fever, muscle aches, or swollen lymph nodes)   Negative: At risk for Mpox (men-who-have-sex-with-men) and possible exposure (e.g., multiple sex partners in past 21 days) and ANY SYMPTOMS OF MPOX (e.g., rash, fever, muscle aches, or swollen lymph nodes)   Negative: Poison ivy, oak, or sumac rash suspected (e.g., itchy rash after contact with poison ivy)   Negative: Rash of female genital area (e.g., labia, vagina, vulva)   Negative: Rash of male genital area (e.g., penis, scrotum)   Negative: Redness of immunization site   Negative: Shingles suspected (i.e., painful rash, multiple small blisters grouped together in one area of body; dermatomal distribution)   Negative: Small spot, skin growth, or mole   Negative: Wound infection suspected (i.e., pain, spreading redness, or pus; in a cut, puncture, scrape or sutured wound)   Negative: Fever and localized purple or blood-colored spots or dots that are not from injury or friction   Negative: Fever and localized rash is very painful   Negative: Patient sounds very sick or weak to the triager   Negative: Looks like a boil, infected sore, deep ulcer, or other infected rash (spreading redness, pus)   Negative: Painful rash with multiple small blisters grouped together (i.e., dermatomal distribution or 'band' or 'stripe')   Negative: Localized rash is very painful (no fever)   Negative: Localized purple or blood-colored spots or dots that are not from injury or friction (no fever)   Negative: Lyme disease suspected (e.g., bull's-eye rash or tick bite / exposure)    Protocols used: Rash or Redness - [id number]-KL    Gilbert Kendall RN  [facility]

## 2024-11-11 NOTE — TELEPHONE ENCOUNTER
Rx sent for triamcinolone cream.  Can take Zyrtec 10 mg during the day and Benadryl 25-50 mg @ HS for itching if needed.

## 2024-12-02 ENCOUNTER — TRANSFERRED RECORDS (OUTPATIENT)
Dept: HEALTH INFORMATION MANAGEMENT | Facility: CLINIC | Age: 59
End: 2024-12-02
Payer: COMMERCIAL

## 2024-12-23 ENCOUNTER — NURSE TRIAGE (OUTPATIENT)
Dept: FAMILY MEDICINE | Facility: CLINIC | Age: 59
End: 2024-12-23
Payer: COMMERCIAL

## 2024-12-23 ENCOUNTER — NURSE TRIAGE (OUTPATIENT)
Dept: FAMILY MEDICINE | Facility: CLINIC | Age: 59
End: 2024-12-23

## 2024-12-23 NOTE — TELEPHONE ENCOUNTER
Nurse Triage SBAR    Is this a 2nd Level Triage? YES, LICENSED PRACTITIONER REVIEW IS REQUIRED    Situation: New productive cough    Background: Began on Thursday 12/19    Assessment: Patient called in stating she is coughing and has a runny nose which has been present since Thursday 12/19. She states that when coughing she is having mild difficulty breathing, but is otherwise fine. She also reports feeling feverish on Friday, but not since and she has never taken her temperature during this illness. She reports coughing up phlegm which is green in color, and her runny nose is also greenish in color with some blood present which the patient attributes to the dry weather.     The patient also endorse wheezing, and as such was recommended to be evaluated today in an UC. She denies this as she states she doesn't feel well enough to drive and would like her PCP to review and potentially prescribe a medication to assist with the illness. We discussed how a response might take a while and when the patient needs to be evaluated and she verbalized understanding.    Routing to PCP for review on if patient should be seen in an UC due to wheezing, or if a prescription is appropriate.     Protocol Recommended Disposition:   Go To Office Now    Recommendation: Go to office now, patient instructed to be seen in UC. She denies this at this time, wishing for her PCP to review.      Routed to provider    Does the patient meet one of the following criteria for ADS visit consideration? 16+ years old, with an [facility] PCP     TIP  Providers, please consider if this condition is appropriate for management at one of our Acute and Diagnostic Services sites.     If patient is a good candidate, please use dotphrase <dot>triageresponse and select Refer to ADS to document.  Reason for Disposition   Wheezing is present    Additional Information   Negative: Bluish (or gray) lips or face   Negative: SEVERE difficulty breathing (e.g., struggling for  "each breath, speaks in single words)   Negative: Rapid onset of cough and has hives   Negative: Coughing started suddenly after medicine, an allergic food or bee sting   Negative: Difficulty breathing after exposure to flames, smoke, or fumes   Negative: Sounds like a life-threatening emergency to the triager   Negative: Previous asthma attacks and this feels like asthma attack   Negative: Dry cough (non-productive; no sputum or minimal clear sputum) and within 14 days of COVID-19 Exposure   Negative: MODERATE difficulty breathing (e.g., speaks in phrases, SOB even at rest, pulse 100-120) and still present when not coughing   Negative: Chest pain present when not coughing   Negative: Passed out (e.g., fainted, lost consciousness, blacked out and was not responding)   Negative: Patient sounds very sick or weak to the triager   Negative: MILD difficulty breathing (e.g., minimal/no SOB at rest, SOB with walking, pulse <100) and still present when not coughing   Negative: Coughed up > 1 tablespoon (15 ml) blood (Exception: Blood-tinged sputum.)   Negative: Fever > 103 F (39.4 C)   Negative: Fever > 101 F (38.3 C) and over 60 years of age   Negative: Fever > 100 F (37.8 C) and has diabetes mellitus or a weak immune system (e.g., HIV positive, cancer chemotherapy, organ transplant, splenectomy, chronic steroids)   Negative: Fever > 100 F (37.8 C) and bedridden (e.g., CVA, chronic illness, recovering from surgery)   Negative: Increasing ankle swelling    Answer Assessment - Initial Assessment Questions  1. ONSET: \"When did the cough begin?\"       12/19  2. SEVERITY: \"How bad is the cough today?\"       About the same as it has been, moderate  3. SPUTUM: \"Describe the color of your sputum\" (e.g., none, dry cough; clear, white, yellow, green)      Greenish  4. HEMOPTYSIS: \"Are you coughing up any blood?\" If Yes, ask: \"How much?\" (e.g., flecks, streaks, tablespoons, etc.)      Blood when blowing nose from dry nose  5. " "DIFFICULTY BREATHING: \"Are you having difficulty breathing?\" If Yes, ask: \"How bad is it?\" (e.g., mild, moderate, severe)       Only when coughing  6. FEVER: \"Do you have a fever?\" If Yes, ask: \"What is your temperature, how was it measured, and when did it start?\"      No, but feverish on Friday, didn't check temp  7. CARDIAC HISTORY: \"Do you have any history of heart disease?\" (e.g., heart attack, congestive heart failure)       No  8. LUNG HISTORY: \"Do you have any history of lung disease?\"  (e.g., pulmonary embolus, asthma, emphysema)      No  9. PE RISK FACTORS: \"Do you have a history of blood clots?\" (or: recent major surgery, recent prolonged travel, bedridden)      No  10. OTHER SYMPTOMS: \"Do you have any other symptoms?\" (e.g., runny nose, wheezing, chest pain)        Runny nose constant, wheezing   11. PREGNANCY: \"Is there any chance you are pregnant?\" \"When was your last menstrual period?\"        No  12. TRAVEL: \"Have you traveled out of the country in the last month?\" (e.g., travel history, exposures)        No    Protocols used: Laura-A-LEONILA Kendall RN  [facility]  "

## 2024-12-24 NOTE — TELEPHONE ENCOUNTER
Nurse Triage - disposition home care - gave home care advice to hydrate - warm fluids and call on Thursday if worsens or go to ER if gets worse before then.  Patient verbalized understanding.     DARRICK Singleton RN       Reason for Disposition   Cough with cold symptoms (e.g., runny nose, postnasal drip, throat clearing)    Additional Information   Negative: Bluish (or gray) lips or face   Negative: SEVERE difficulty breathing (e.g., struggling for each breath, speaks in single words)   Negative: Rapid onset of cough and has hives   Negative: Coughing started suddenly after medicine, an allergic food or bee sting   Negative: Difficulty breathing after exposure to flames, smoke, or fumes   Negative: Sounds like a life-threatening emergency to the triager   Negative: Previous asthma attacks and this feels like asthma attack   Negative: Dry cough (non-productive; no sputum or minimal clear sputum) and within 14 days of COVID-19 Exposure   Negative: MODERATE difficulty breathing (e.g., speaks in phrases, SOB even at rest, pulse 100-120) and still present when not coughing   Negative: Chest pain present when not coughing   Negative: Passed out (e.g., fainted, lost consciousness, blacked out and was not responding)   Negative: Patient sounds very sick or weak to the triager   Negative: MILD difficulty breathing (e.g., minimal/no SOB at rest, SOB with walking, pulse <100) and still present when not coughing   Negative: Coughed up > 1 tablespoon (15 ml) blood (Exception: Blood-tinged sputum.)   Negative: Fever > 103 F (39.4 C)   Negative: Fever > 101 F (38.3 C) and over 60 years of age   Negative: Fever > 100 F (37.8 C) and has diabetes mellitus or a weak immune system (e.g., HIV positive, cancer chemotherapy, organ transplant, splenectomy, chronic steroids)   Negative: Fever > 100 F (37.8 C) and bedridden (e.g., CVA, chronic illness, recovering from surgery)   Negative: Increasing ankle swelling   Negative: Wheezing is  "present   Negative: SEVERE coughing spells (e.g., whooping sound after coughing, vomiting after coughing)   Negative: Coughing up lj-colored (reddish-brown) or blood-tinged sputum   Negative: Fever present > 3 days (72 hours)   Negative: Fever returns after gone for over 24 hours and symptoms worse or not improved   Negative: Using nasal washes and pain medicine > 24 hours and sinus pain persists   Negative: Known COPD or other severe lung disease (i.e., bronchiectasis, cystic fibrosis, lung surgery) and symptoms getting worse (i.e., increased sputum purulence or amount, increased breathing difficulty)   Negative: Continuous (nonstop) coughing interferes with work or school and no improvement using cough treatment per Care Advice   Negative: Patient wants to be seen   Negative: Cough has been present for > 3 weeks   Negative: Allergy symptoms are also present (e.g., itchy eyes, clear nasal discharge, postnasal drip)   Negative: Nasal discharge present > 10 days   Negative: Exposure to TB (Tuberculosis)   Negative: Taking an ACE Inhibitor medicine (e.g., benazepril/LOTENSIN, captopril/CAPOTEN, enalapril/VASOTEC, lisinopril/ZESTRIL)   Negative: Cough with no complications    Answer Assessment - Initial Assessment Questions  1. ONSET: \"When did the cough begin?\"       19th  2. SEVERITY: \"How bad is the cough today?\"       Bad  thick  3. SPUTUM: \"Describe the color of your sputum\" (e.g., none, dry cough; clear, white, yellow, green)      Yellow and is on abx  4. HEMOPTYSIS: \"Are you coughing up any blood?\" If Yes, ask: \"How much?\" (e.g., flecks, streaks, tablespoons, etc.)      Tingedd but due to dryness  5. DIFFICULTY BREATHING: \"Are you having difficulty breathing?\" If Yes, ask: \"How bad is it?\" (e.g., mild, moderate, severe)       No just when coughing  6. FEVER: \"Do you have a fever?\" If Yes, ask: \"What is your temperature, how was it measured, and when did it start?\"      no  7. CARDIAC HISTORY: \"Do you have any " "history of heart disease?\" (e.g., heart attack, congestive heart failure)        no  8. LUNG HISTORY: \"Do you have any history of lung disease?\"  (e.g., pulmonary embolus, asthma, emphysema)       no  9. PE RISK FACTORS: \"Do you have a history of blood clots?\" (or: recent major surgery, recent prolonged travel, bedridden)       no  10. OTHER SYMPTOMS: \"Do you have any other symptoms?\" (e.g., runny nose, wheezing, chest pain)        Chest tisghtness  11. PREGNANCY: \"Is there any chance you are pregnant?\" \"When was your last menstrual period?\"        no  12. TRAVEL: \"Have you traveled out of the country in the last month?\" (e.g., travel history, exposures)        no    Protocols used: Cough-A-OH    "

## 2025-01-27 ENCOUNTER — PATIENT OUTREACH (OUTPATIENT)
Dept: CARE COORDINATION | Facility: CLINIC | Age: 60
End: 2025-01-27
Payer: COMMERCIAL

## 2025-02-10 ENCOUNTER — PATIENT OUTREACH (OUTPATIENT)
Dept: CARE COORDINATION | Facility: CLINIC | Age: 60
End: 2025-02-10
Payer: COMMERCIAL

## 2025-02-17 ENCOUNTER — OFFICE VISIT (OUTPATIENT)
Dept: FAMILY MEDICINE | Facility: CLINIC | Age: 60
End: 2025-02-17
Payer: COMMERCIAL

## 2025-02-17 VITALS
WEIGHT: 118 LBS | DIASTOLIC BLOOD PRESSURE: 72 MMHG | HEIGHT: 64 IN | BODY MASS INDEX: 20.14 KG/M2 | TEMPERATURE: 98.3 F | OXYGEN SATURATION: 98 % | HEART RATE: 62 BPM | RESPIRATION RATE: 16 BRPM | SYSTOLIC BLOOD PRESSURE: 113 MMHG

## 2025-02-17 DIAGNOSIS — K20.80 ESOPHAGITIS, LOS ANGELES GRADE D: ICD-10-CM

## 2025-02-17 DIAGNOSIS — R09.81 SINUS CONGESTION: ICD-10-CM

## 2025-02-17 DIAGNOSIS — R21 RASH: ICD-10-CM

## 2025-02-17 DIAGNOSIS — N18.31 STAGE 3A CHRONIC KIDNEY DISEASE (H): ICD-10-CM

## 2025-02-17 DIAGNOSIS — J01.00 ACUTE NON-RECURRENT MAXILLARY SINUSITIS: Primary | ICD-10-CM

## 2025-02-17 DIAGNOSIS — R53.83 FATIGUE, UNSPECIFIED TYPE: ICD-10-CM

## 2025-02-17 DIAGNOSIS — K44.9 HIATAL HERNIA: ICD-10-CM

## 2025-02-17 DIAGNOSIS — B00.1 RECURRENT COLD SORES: ICD-10-CM

## 2025-02-17 PROCEDURE — G2211 COMPLEX E/M VISIT ADD ON: HCPCS | Performed by: FAMILY MEDICINE

## 2025-02-17 PROCEDURE — 99214 OFFICE O/P EST MOD 30 MIN: CPT | Performed by: FAMILY MEDICINE

## 2025-02-17 RX ORDER — AZITHROMYCIN 250 MG/1
TABLET, FILM COATED ORAL
Qty: 6 TABLET | Refills: 0 | Status: SHIPPED | OUTPATIENT
Start: 2025-02-17 | End: 2025-02-22

## 2025-02-17 RX ORDER — CEFPODOXIME PROXETIL 200 MG/1
TABLET, FILM COATED ORAL
COMMUNITY
Start: 2025-02-08

## 2025-02-17 RX ORDER — VALACYCLOVIR HYDROCHLORIDE 1 G/1
1000 TABLET, FILM COATED ORAL 2 TIMES DAILY
Qty: 20 TABLET | Refills: 3 | Status: SHIPPED | OUTPATIENT
Start: 2025-02-17

## 2025-02-17 RX ORDER — CHLORHEXIDINE GLUCONATE ORAL RINSE 1.2 MG/ML
SOLUTION DENTAL
COMMUNITY
Start: 2025-02-06

## 2025-02-17 RX ORDER — TRIAMCINOLONE ACETONIDE 1 MG/G
CREAM TOPICAL 2 TIMES DAILY
Qty: 30 G | Refills: 0 | Status: SHIPPED | OUTPATIENT
Start: 2025-02-17

## 2025-02-17 RX ORDER — GUAIFENESIN 600 MG/1
1200 TABLET, EXTENDED RELEASE ORAL 2 TIMES DAILY
Qty: 60 TABLET | Refills: 0 | Status: SHIPPED | OUTPATIENT
Start: 2025-02-17

## 2025-02-17 ASSESSMENT — PATIENT HEALTH QUESTIONNAIRE - PHQ9
SUM OF ALL RESPONSES TO PHQ QUESTIONS 1-9: 11
SUM OF ALL RESPONSES TO PHQ QUESTIONS 1-9: 11
10. IF YOU CHECKED OFF ANY PROBLEMS, HOW DIFFICULT HAVE THESE PROBLEMS MADE IT FOR YOU TO DO YOUR WORK, TAKE CARE OF THINGS AT HOME, OR GET ALONG WITH OTHER PEOPLE: SOMEWHAT DIFFICULT

## 2025-02-17 ASSESSMENT — PAIN SCALES - GENERAL: PAINLEVEL_OUTOF10: NO PAIN (0)

## 2025-02-17 NOTE — PROGRESS NOTES
"  Assessment & Plan     Acute non-recurrent maxillary sinusitis  ***  - azithromycin (ZITHROMAX) 250 MG tablet  Dispense: 6 tablet; Refill: 0    Sinus congestion  ***  - guaiFENesin (MUCINEX) 600 MG 12 hr tablet  Dispense: 60 tablet; Refill: 0    Fatigue, unspecified type  ***    Hiatal hernia  ***  - Adult GI  Referral - Consult Only    Esophagitis, [place] grade D  ***  - Adult GI  Referral - Consult Only    Recurrent cold sores  ***  - valACYclovir (VALTREX) 1000 mg tablet  Dispense: 20 tablet; Refill: 3    Rash  ***  - triamcinolone (KENALOG) 0.1 % external cream  Dispense: 30 g; Refill: 0              FUTURE APPOINTMENTS:       - Follow-up visit in ***    Brad Alexander is a 59 year old, presenting for the following health issues:  Nasal Congestion (On going for 2 months , no cough, fatigued, sleeping a lot , ) and Hernia (Known hernia is bothering pt waking her up at night )      2/17/2025     9:53 AM   Additional Questions   Roomed by erasmo mondragon cma     History of Present Illness       Reason for visit:  Sinus congestion, fatigue and upset stomach  Symptom onset:  More than a month  Symptom intensity:  Severe  Symptom progression:  Staying the same  Had these symptoms before:  No  What makes it better:  Using afrin daily non stop and moatly drinking my meals   She is taking medications regularly.       {MA/LPN/RN Pre-Provider Visit Orders- hCG/UA/Strep (Optional):872930}  {SUPERLIST (Optional):624740}  {additonal problems for provider to add (Optional):269815}    {ROS Picklists (Optional):544281}      Objective    /72 (BP Location: Left arm, Patient Position: Sitting, Cuff Size: Adult Regular)   Pulse 62   Temp 98.3  F (36.8  C)   Resp 16   Ht 1.613 m (5' 3.5\")   Wt 53.5 kg (118 lb)   LMP 07/21/2021   SpO2 98%   BMI 20.57 kg/m    Body mass index is 20.57 kg/m .  Physical Exam   {Exam List (Optional):163829}    {Diagnostic Test Results (Optional):372609}        Signed " Electronically by: Marychuy Marion MD  {Email feedback regarding this note to [email or web address]   :[id number]}    SpO2 98%   BMI 20.57 kg/m    Body mass index is 20.57 kg/m .  Physical Exam   GENERAL: alert, no distress, pale, frail, and fatigued appearing  EYES: Eyes grossly normal to inspection  HENT: normal cephalic/atraumatic, ear canals and TM's normal, nose and mouth without ulcers or lesions, oral mucous membranes moist, and sinuses: maxillary tenderness on left  RESP: lungs clear to auscultation - no rales, rhonchi or wheezes  CV: regular rates and rhythm  ABDOMEN: soft, nontender  MS: no gross musculoskeletal defects noted, no edema          Signed Electronically by: Marychuy Marion MD

## 2025-03-03 SDOH — HEALTH STABILITY: PHYSICAL HEALTH: ON AVERAGE, HOW MANY DAYS PER WEEK DO YOU ENGAGE IN MODERATE TO STRENUOUS EXERCISE (LIKE A BRISK WALK)?: 5 DAYS

## 2025-03-03 SDOH — HEALTH STABILITY: PHYSICAL HEALTH: ON AVERAGE, HOW MANY MINUTES DO YOU ENGAGE IN EXERCISE AT THIS LEVEL?: 60 MIN

## 2025-03-03 ASSESSMENT — ANXIETY QUESTIONNAIRES
IF YOU CHECKED OFF ANY PROBLEMS ON THIS QUESTIONNAIRE, HOW DIFFICULT HAVE THESE PROBLEMS MADE IT FOR YOU TO DO YOUR WORK, TAKE CARE OF THINGS AT HOME, OR GET ALONG WITH OTHER PEOPLE: VERY DIFFICULT
4. TROUBLE RELAXING: MORE THAN HALF THE DAYS
6. BECOMING EASILY ANNOYED OR IRRITABLE: NEARLY EVERY DAY
3. WORRYING TOO MUCH ABOUT DIFFERENT THINGS: SEVERAL DAYS
GAD7 TOTAL SCORE: 11
8. IF YOU CHECKED OFF ANY PROBLEMS, HOW DIFFICULT HAVE THESE MADE IT FOR YOU TO DO YOUR WORK, TAKE CARE OF THINGS AT HOME, OR GET ALONG WITH OTHER PEOPLE?: VERY DIFFICULT
2. NOT BEING ABLE TO STOP OR CONTROL WORRYING: SEVERAL DAYS
GAD7 TOTAL SCORE: 11
7. FEELING AFRAID AS IF SOMETHING AWFUL MIGHT HAPPEN: NOT AT ALL
5. BEING SO RESTLESS THAT IT IS HARD TO SIT STILL: MORE THAN HALF THE DAYS
GAD7 TOTAL SCORE: 11
1. FEELING NERVOUS, ANXIOUS, OR ON EDGE: MORE THAN HALF THE DAYS
7. FEELING AFRAID AS IF SOMETHING AWFUL MIGHT HAPPEN: NOT AT ALL

## 2025-03-03 ASSESSMENT — SOCIAL DETERMINANTS OF HEALTH (SDOH): HOW OFTEN DO YOU GET TOGETHER WITH FRIENDS OR RELATIVES?: THREE TIMES A WEEK

## 2025-03-03 ASSESSMENT — PATIENT HEALTH QUESTIONNAIRE - PHQ9
SUM OF ALL RESPONSES TO PHQ QUESTIONS 1-9: 9
SUM OF ALL RESPONSES TO PHQ QUESTIONS 1-9: 9
10. IF YOU CHECKED OFF ANY PROBLEMS, HOW DIFFICULT HAVE THESE PROBLEMS MADE IT FOR YOU TO DO YOUR WORK, TAKE CARE OF THINGS AT HOME, OR GET ALONG WITH OTHER PEOPLE: SOMEWHAT DIFFICULT

## 2025-03-04 ENCOUNTER — OFFICE VISIT (OUTPATIENT)
Dept: FAMILY MEDICINE | Facility: CLINIC | Age: 60
End: 2025-03-04
Payer: COMMERCIAL

## 2025-03-04 VITALS
RESPIRATION RATE: 16 BRPM | TEMPERATURE: 98.2 F | WEIGHT: 117 LBS | HEIGHT: 63 IN | HEART RATE: 76 BPM | BODY MASS INDEX: 20.73 KG/M2 | DIASTOLIC BLOOD PRESSURE: 72 MMHG | SYSTOLIC BLOOD PRESSURE: 114 MMHG | OXYGEN SATURATION: 95 %

## 2025-03-04 DIAGNOSIS — Z23 NEED FOR PROPHYLACTIC VACCINATION AGAINST HEPATITIS B VIRUS: ICD-10-CM

## 2025-03-04 DIAGNOSIS — F30.9 BIPOLAR I DISORDER, SINGLE MANIC EPISODE (H): Chronic | ICD-10-CM

## 2025-03-04 DIAGNOSIS — E78.5 HYPERLIPIDEMIA LDL GOAL <130: Chronic | ICD-10-CM

## 2025-03-04 DIAGNOSIS — Z00.00 ENCOUNTER FOR MEDICARE ANNUAL WELLNESS EXAM: Primary | ICD-10-CM

## 2025-03-04 DIAGNOSIS — K20.80 ESOPHAGITIS, LOS ANGELES GRADE D: ICD-10-CM

## 2025-03-04 DIAGNOSIS — F03.A0 MILD DEMENTIA WITHOUT BEHAVIORAL DISTURBANCE, PSYCHOTIC DISTURBANCE, MOOD DISTURBANCE, OR ANXIETY, UNSPECIFIED DEMENTIA TYPE (H): ICD-10-CM

## 2025-03-04 DIAGNOSIS — N18.31 STAGE 3A CHRONIC KIDNEY DISEASE (H): Chronic | ICD-10-CM

## 2025-03-04 DIAGNOSIS — N18.30 CHRONIC KIDNEY DISEASE, STAGE III (MODERATE) (H): ICD-10-CM

## 2025-03-04 DIAGNOSIS — M12.9 ARTHRITIS/ARTHROPATHY OF MULTIPLE JOINTS: Chronic | ICD-10-CM

## 2025-03-04 LAB
ERYTHROCYTE [DISTWIDTH] IN BLOOD BY AUTOMATED COUNT: 13.4 % (ref 10–15)
HCT VFR BLD AUTO: 40.1 % (ref 35–47)
HGB BLD-MCNC: 13.2 G/DL (ref 11.7–15.7)
MCH RBC QN AUTO: 31.4 PG (ref 26.5–33)
MCHC RBC AUTO-ENTMCNC: 32.9 G/DL (ref 31.5–36.5)
MCV RBC AUTO: 95 FL (ref 78–100)
PLATELET # BLD AUTO: 240 10E3/UL (ref 150–450)
RBC # BLD AUTO: 4.21 10E6/UL (ref 3.8–5.2)
WBC # BLD AUTO: 5.9 10E3/UL (ref 4–11)

## 2025-03-04 PROCEDURE — 80053 COMPREHEN METABOLIC PANEL: CPT | Performed by: FAMILY MEDICINE

## 2025-03-04 PROCEDURE — 82570 ASSAY OF URINE CREATININE: CPT | Performed by: FAMILY MEDICINE

## 2025-03-04 PROCEDURE — 36415 COLL VENOUS BLD VENIPUNCTURE: CPT | Performed by: FAMILY MEDICINE

## 2025-03-04 PROCEDURE — 82043 UR ALBUMIN QUANTITATIVE: CPT | Performed by: FAMILY MEDICINE

## 2025-03-04 PROCEDURE — 80061 LIPID PANEL: CPT | Performed by: FAMILY MEDICINE

## 2025-03-04 PROCEDURE — 86140 C-REACTIVE PROTEIN: CPT | Performed by: FAMILY MEDICINE

## 2025-03-04 PROCEDURE — 85027 COMPLETE CBC AUTOMATED: CPT | Performed by: FAMILY MEDICINE

## 2025-03-04 ASSESSMENT — PAIN SCALES - GENERAL: PAINLEVEL_OUTOF10: NO PAIN (0)

## 2025-03-04 NOTE — PATIENT INSTRUCTIONS
Patient Education   Preventive Care Advice   This is general advice given by our system to help you stay healthy. However, your care team may have specific advice just for you. Please talk to your care team about your preventive care needs.  Nutrition  Eat 5 or more servings of fruits and vegetables each day.  Try wheat bread, brown rice and whole grain pasta (instead of white bread, rice, and pasta).  Get enough calcium and vitamin D. Check the label on foods and aim for 100% of the RDA (recommended daily allowance).  Lifestyle  Exercise at least 150 minutes each week  (30 minutes a day, 5 days a week).  Do muscle strengthening activities 2 days a week. These help control your weight and prevent disease.  No smoking.  Wear sunscreen to prevent skin cancer.  Have a dental exam and cleaning every 6 months.  Yearly exams  See your health care team every year to talk about:  Any changes in your health.  Any medicines your care team has prescribed.  Preventive care, family planning, and ways to prevent chronic diseases.  Shots (vaccines)   HPV shots (up to age 26), if you've never had them before.  Hepatitis B shots (up to age 59), if you've never had them before.  COVID-19 shot: Get this shot when it's due.  Flu shot: Get a flu shot every year.  Tetanus shot: Get a tetanus shot every 10 years.  Pneumococcal, hepatitis A, and RSV shots: Ask your care team if you need these based on your risk.  Shingles shot (for age 50 and up)  General health tests  Diabetes screening:  Starting at age 35, Get screened for diabetes at least every 3 years.  If you are younger than age 35, ask your care team if you should be screened for diabetes.  Cholesterol test: At age 39, start having a cholesterol test every 5 years, or more often if advised.  Bone density scan (DEXA): At age 50, ask your care team if you should have this scan for osteoporosis (brittle bones).  Hepatitis C: Get tested at least once in your life.  STIs (sexually  transmitted infections)  Before age 24: Ask your care team if you should be screened for STIs.  After age 24: Get screened for STIs if you're at risk. You are at risk for STIs (including HIV) if:  You are sexually active with more than one person.  You don't use condoms every time.  You or a partner was diagnosed with a sexually transmitted infection.  If you are at risk for HIV, ask about PrEP medicine to prevent HIV.  Get tested for HIV at least once in your life, whether you are at risk for HIV or not.  Cancer screening tests  Cervical cancer screening: If you have a cervix, begin getting regular cervical cancer screening tests starting at age 21.  Breast cancer scan (mammogram): If you've ever had breasts, begin having regular mammograms starting at age 40. This is a scan to check for breast cancer.  Colon cancer screening: It is important to start screening for colon cancer at age 45.  Have a colonoscopy test every 10 years (or more often if you're at risk) Or, ask your provider about stool tests like a FIT test every year or Cologuard test every 3 years.  To learn more about your testing options, visit:   .  For help making a decision, visit:   https://bit.ly/ee25935.  Prostate cancer screening test: If you have a prostate, ask your care team if a prostate cancer screening test (PSA) at age 55 is right for you.  Lung cancer screening: If you are a current or former smoker ages 50 to 80, ask your care team if ongoing lung cancer screenings are right for you.  For informational purposes only. Not to replace the advice of your health care provider. Copyright   2023 [facility]. All rights reserved. Clinically reviewed by the [facility]itions [facility]. [employer] [id number] - REV 01/24.  Hearing Loss: Care Instructions  Overview     Hearing loss is a sudden or slow decrease in how well you hear. It can range from slight to profound. Permanent hearing loss can occur with aging. It also can  happen when you are exposed long-term to loud noise. Examples include listening to loud music, riding motorcycles, or being around other loud machines.  Hearing loss can affect your work and home life. It can make you feel lonely or depressed. You may feel that you have lost your independence. But hearing aids and other devices can help you hear better and feel connected to others.  Follow-up care is a key part of your treatment and safety. Be sure to make and go to all appointments, and call your doctor if you are having problems. It's also a good idea to know your test results and keep a list of the medicines you take.  How can you care for yourself at home?  Avoid loud noises whenever possible. This helps keep your hearing from getting worse.  Always wear hearing protection around loud noises.  Wear a hearing aid as directed.  A professional can help you pick a hearing aid that will work best for you.  You can also get hearing aids over the counter for mild to moderate hearing loss.  Have hearing tests as your doctor suggests. They can show whether your hearing has changed. Your hearing aid may need to be adjusted.  Use other devices as needed. These may include:  Telephone amplifiers and hearing aids that can connect to a television, stereo, radio, or microphone.  Devices that use lights or vibrations. These alert you to the doorbell, a ringing telephone, or a baby monitor.  Television closed-captioning. This shows the words at the bottom of the screen. Most new TVs can do this.  TTY (text telephone). This lets you type messages back and forth on the telephone instead of talking or listening. These devices are also called TDD. When messages are typed on the keyboard, they are sent over the phone line to a receiving TTY. The message is shown on a monitor.  Use text messaging, social media, and email if it is hard for you to communicate by telephone.  Try to learn a listening technique called speechreading. It is  "not lipreading. You pay attention to people's gestures, expressions, posture, and tone of voice. These clues can help you understand what a person is saying. Face the person you are talking to, and have them face you. Make sure the lighting is good. You need to see the other person's face clearly.  Think about counseling if you need help to adjust to your hearing loss.  When should you call for help?  Watch closely for changes in your health, and be sure to contact your doctor if:    You think your hearing is getting worse.     You have new symptoms, such as dizziness or nausea.   Where can you learn more?  Go to https://www.[employer]Jay.net/patiented  Enter R798 in the search box to learn more about \"Hearing Loss: Care Instructions.\"  Current as of: September 27, 2023  Content Version: 14.3    2024 [employer].   Care instructions adapted under license by your healthcare professional. If you have questions about a medical condition or this instruction, always ask your healthcare professional. [employer] disclaims any warranty or liability for your use of this information.    Learning About Stress  What is stress?     Stress is your body's response to a hard situation. Your body can have a physical, emotional, or mental response. Stress is a fact of life for most people, and it affects everyone differently. What causes stress for you may not be stressful for someone else.  A lot of things can cause stress. You may feel stress when you go on a job interview, take a test, or run a race. This kind of short-term stress is normal and even useful. It can help you if you need to work hard or react quickly. For example, stress can help you finish an important job on time.  Long-term stress is caused by ongoing stressful situations or events. Examples of long-term stress include long-term health problems, ongoing problems at work, or conflicts in your family. Long-term stress can harm your health.  How " does stress affect your health?  When you are stressed, your body responds as though you are in danger. It makes hormones that speed up your heart, make you breathe faster, and give you a burst of energy. This is called the fight-or-flight stress response. If the stress is over quickly, your body goes back to normal and no harm is done.  But if stress happens too often or lasts too long, it can have bad effects. Long-term stress can make you more likely to get sick, and it can make symptoms of some diseases worse. If you tense up when you are stressed, you may develop neck, shoulder, or low back pain. Stress is linked to high blood pressure and heart disease.  Stress also harms your emotional health. It can make you fernández, tense, or depressed. Your relationships may suffer, and you may not do well at work or school.  What can you do to manage stress?  You can try these things to help manage stress:   Do something active. Exercise or activity can help reduce stress. Walking is a great way to get started. Even everyday activities such as housecleaning or yard work can help.  Try yoga or nghia chi. These techniques combine exercise and meditation. You may need some training at first to learn them.  Do something you enjoy. For example, listen to music or go to a movie. Practice your hobby or do volunteer work.  Meditate. This can help you relax, because you are not worrying about what happened before or what may happen in the future.  Do guided imagery. Imagine yourself in any setting that helps you feel calm. You can use online videos, books, or a teacher to guide you.  Do breathing exercises. For example:  From a standing position, bend forward from the waist with your knees slightly bent. Let your arms dangle close to the floor.  Breathe in slowly and deeply as you return to a standing position. Roll up slowly and lift your head last.  Hold your breath for just a few seconds in the standing position.  Breathe out  "slowly and bend forward from the waist.  Let your feelings out. Talk, laugh, cry, and express anger when you need to. Talking with supportive friends or family, a counselor, or a emerita leader about your feelings is a healthy way to relieve stress. Avoid discussing your feelings with people who make you feel worse.  Write. It may help to write about things that are bothering you. This helps you find out how much stress you feel and what is causing it. When you know this, you can find better ways to cope.  What can you do to prevent stress?  You might try some of these things to help prevent stress:  Manage your time. This helps you find time to do the things you want and need to do.  Get enough sleep. Your body recovers from the stresses of the day while you are sleeping.  Get support. Your family, friends, and community can make a difference in how you experience stress.  Limit your news feed. Avoid or limit time on social media or news that may make you feel stressed.  Do something active. Exercise or activity can help reduce stress. Walking is a great way to get started.  Where can you learn more?  Go to https://www.[employer].net/patiented  Enter N032 in the search box to learn more about \"Learning About Stress.\"  Current as of: October 24, 2023  Content Version: 14.3    2024 [employer].   Care instructions adapted under license by your healthcare professional. If you have questions about a medical condition or this instruction, always ask your healthcare professional. [employer] disclaims any warranty or liability for your use of this information.    Learning About Sleeping Well  What does sleeping well mean?     Sleeping well means getting enough sleep to feel good and stay healthy. How much sleep is enough varies among people.  The number of hours you sleep and how you feel when you wake up are both important. If you do not feel refreshed, you probably need more sleep. Another sign of " "not getting enough sleep is feeling tired during the day.  Experts recommend that adults get at least 7 or more hours of sleep per day. Children and older adults need more sleep.  Why is getting enough sleep important?  Getting enough quality sleep is a basic part of good health. When your sleep suffers, your physical health, mood, and your thoughts can suffer too. You may find yourself feeling more grumpy or stressed. Not getting enough sleep also can lead to serious problems, including injury, accidents, anxiety, and depression.  What might cause poor sleeping?  Many things can cause sleep problems, including:  Changes to your sleep schedule.  Stress. Stress can be caused by fear about a single event, such as giving a speech. Or you may have ongoing stress, such as worry about work or school.  Depression, anxiety, and other mental or emotional conditions.  Changes in your sleep habits or surroundings. This includes changes that happen where you sleep, such as noise, light, or sleeping in a different bed. It also includes changes in your sleep pattern, such as having jet lag or working a late shift.  Health problems, such as pain, breathing problems, and restless legs syndrome.  Lack of regular exercise.  Using alcohol, nicotine, or caffeine before bed.  How can you help yourself?  Here are some tips that may help you sleep more soundly and wake up feeling more refreshed.  Your sleeping area   Use your bedroom only for sleeping and sex. A bit of light reading may help you fall asleep. But if it doesn't, do your reading elsewhere in the house. Try not to use your TV, computer, smartphone, or tablet while you are in bed.  Be sure your bed is big enough to stretch out comfortably, especially if you have a sleep partner.  Keep your bedroom quiet, dark, and cool. Use curtains, blinds, or a sleep mask to block out light. To block out noise, use earplugs, soothing music, or a \"white noise\" machine.  Your evening and " "bedtime routine   Create a relaxing bedtime routine. You might want to take a warm shower or bath, or listen to soothing music.  Go to bed at the same time every night. And get up at the same time every morning, even if you feel tired.  What to avoid   Limit caffeine (coffee, tea, caffeinated sodas) during the day, and don't have any for at least 6 hours before bedtime.  Avoid drinking alcohol before bedtime. Alcohol can cause you to wake up more often during the night.  Try not to smoke or use tobacco, especially in the evening. Nicotine can keep you awake.  Limit naps during the day, especially close to bedtime.  Avoid lying in bed awake for too long. If you can't fall asleep or if you wake up in the middle of the night and can't get back to sleep within about 20 minutes, get out of bed and go to another room until you feel sleepy.  Avoid taking medicine right before bed that may keep you awake or make you feel hyper or energized. Your doctor can tell you if your medicine may do this and if you can take it earlier in the day.  If you can't sleep   Imagine yourself in a peaceful, pleasant scene. Focus on the details and feelings of being in a place that is relaxing.  Get up and do a quiet or boring activity until you feel sleepy.  Avoid drinking any liquids before going to bed to help prevent waking up often to use the bathroom.  Where can you learn more?  Go to https://www.[employer].net/patiented  Enter J942 in the search box to learn more about \"Learning About Sleeping Well.\"  Current as of: July 31, 2024  Content Version: 14.3    2024 [employer].   Care instructions adapted under license by your healthcare professional. If you have questions about a medical condition or this instruction, always ask your healthcare professional. [employer] disclaims any warranty or liability for your use of this information.    Bladder Training: Care Instructions  Your Care Instructions     Bladder " training is used to treat urge incontinence and stress incontinence. Urge incontinence means that the need to urinate comes on so fast that you can't get to a toilet in time. Stress incontinence means that you leak urine because of pressure on your bladder. For example, it may happen when you laugh, cough, or lift something heavy.  Bladder training can increase how long you can wait before you have to urinate. It can also help your bladder hold more urine. And it can give you better control over the urge to urinate.  It is important to remember that bladder training takes a few weeks to a few months to make a difference. You may not see results right away, but don't give up.  Follow-up care is a key part of your treatment and safety. Be sure to make and go to all appointments, and call your doctor if you are having problems. It's also a good idea to know your test results and keep a list of the medicines you take.  How can you care for yourself at home?  Work with your doctor to come up with a bladder training program that is right for you. You may use one or more of the following methods.  Delayed urination  In the beginning, try to keep from urinating for 5 minutes after you first feel the need to go.  While you wait, take deep, slow breaths to relax. Kegel exercises can also help you delay the need to go to the bathroom.  After some practice, when you can easily wait 5 minutes to urinate, try to wait 10 minutes before you urinate.  Slowly increase the waiting period until you are able to control when you have to urinate.  Scheduled urination  Empty your bladder when you first wake up in the morning.  Schedule times throughout the day when you will urinate.  Start by going to the bathroom every hour, even if you don't need to go.  Slowly increase the time between trips to the bathroom.  When you have found a schedule that works well for you, keep doing it.  If you wake up during the night and have to urinate, do  "it. Apply your schedule to waking hours only.  Kegel exercises  These tighten and strengthen pelvic muscles, which can help you control the flow of urine. (If doing these exercises causes pain, stop doing them and talk with your doctor.) To do Kegel exercises:  Squeeze your muscles as if you were trying not to pass gas. Or squeeze your muscles as if you were stopping the flow of urine. Your belly, legs, and buttocks shouldn't move.  Hold the squeeze for 3 seconds, then relax for 5 to 10 seconds.  Start with 3 seconds, then add 1 second each week until you are able to squeeze for 10 seconds.  Repeat the exercise 10 times a session. Do 3 to 8 sessions a day.  When should you call for help?  Watch closely for changes in your health, and be sure to contact your doctor if:    Your incontinence is getting worse.     You do not get better as expected.   Where can you learn more?  Go to https://www.[employer]/patiented  Enter V684 in the search box to learn more about \"Bladder Training: Care Instructions.\"  Current as of: April 30, 2024  Content Version: 14.3    2024 [employer].   Care instructions adapted under license by your healthcare professional. If you have questions about a medical condition or this instruction, always ask your healthcare professional. [employer] disclaims any warranty or liability for your use of this information.    Learning About Depression Screening  What is depression screening?  Depression screening is a way to see if you have depression symptoms. It may be done by a doctor or counselor. It's often part of a routine checkup. That's because your mental health is just as important as your physical health.  Depression is a mental health condition that affects how you feel, think, and act. You may:  Have less energy.  Lose interest in your daily activities.  Feel sad and grouchy for a long time.  Depression is very common. It affects people of all ages.  Many things can " "lead to depression. Some people become depressed after they have a stroke or find out they have a major illness like cancer or heart disease. The death of a loved one or a breakup may lead to depression. It can run in families. Most experts believe that a combination of inherited genes and stressful life events can cause it.  What happens during screening?  You may be asked to fill out a form about your depression symptoms. You and the doctor will discuss your answers. The doctor may ask you more questions to learn more about how you think, act, and feel.  What happens after screening?  If you have symptoms of depression, your doctor will talk to you about your options.  Doctors usually treat depression with medicines or counseling. Often, combining the two works best. Many people don't get help because they think that they'll get over the depression on their own. But people with depression may not get better unless they get treatment.  The cause of depression is not well understood. There may be many factors involved. But if you have depression, it's not your fault.  A serious symptom of depression is thinking about death or suicide. If you or someone you care about talks about this or about feeling hopeless, get help right away.  It's important to know that depression can be treated. Medicine, counseling, and self-care may help.  Where can you learn more?  Go to https://www.[employer]Park.net/patiented  Enter T185 in the search box to learn more about \"Learning About Depression Screening.\"  Current as of: July 31, 2024  Content Version: 14.3    2024 [employer].   Care instructions adapted under license by your healthcare professional. If you have questions about a medical condition or this instruction, always ask your healthcare professional. [employer] disclaims any warranty or liability for your use of this information.       "

## 2025-03-05 LAB
ALBUMIN SERPL BCG-MCNC: 4.4 G/DL (ref 3.5–5.2)
ALP SERPL-CCNC: 108 U/L (ref 40–150)
ALT SERPL W P-5'-P-CCNC: 25 U/L (ref 0–50)
ANION GAP SERPL CALCULATED.3IONS-SCNC: 11 MMOL/L (ref 7–15)
AST SERPL W P-5'-P-CCNC: 36 U/L (ref 0–45)
BILIRUB SERPL-MCNC: 0.4 MG/DL
BUN SERPL-MCNC: 31.2 MG/DL (ref 8–23)
CALCIUM SERPL-MCNC: 10.2 MG/DL (ref 8.8–10.4)
CHLORIDE SERPL-SCNC: 104 MMOL/L (ref 98–107)
CHOLEST SERPL-MCNC: 221 MG/DL
CREAT SERPL-MCNC: 1.31 MG/DL (ref 0.51–0.95)
CREAT UR-MCNC: 68 MG/DL
CRP SERPL-MCNC: 3.76 MG/L
EGFRCR SERPLBLD CKD-EPI 2021: 47 ML/MIN/1.73M2
FASTING STATUS PATIENT QL REPORTED: NO
FASTING STATUS PATIENT QL REPORTED: NO
GLUCOSE SERPL-MCNC: 112 MG/DL (ref 70–99)
HCO3 SERPL-SCNC: 25 MMOL/L (ref 22–29)
HDLC SERPL-MCNC: 75 MG/DL
LDLC SERPL CALC-MCNC: 131 MG/DL
MICROALBUMIN UR-MCNC: <12 MG/L
MICROALBUMIN/CREAT UR: NORMAL MG/G{CREAT}
NONHDLC SERPL-MCNC: 146 MG/DL
POTASSIUM SERPL-SCNC: 4.2 MMOL/L (ref 3.4–5.3)
PROT SERPL-MCNC: 7 G/DL (ref 6.4–8.3)
SODIUM SERPL-SCNC: 140 MMOL/L (ref 135–145)
TRIGL SERPL-MCNC: 74 MG/DL

## 2025-03-24 ENCOUNTER — ANCILLARY ORDERS (OUTPATIENT)
Dept: MAMMOGRAPHY | Facility: CLINIC | Age: 60
End: 2025-03-24
Payer: COMMERCIAL

## 2025-03-24 DIAGNOSIS — Z12.31 VISIT FOR SCREENING MAMMOGRAM: Primary | ICD-10-CM

## 2025-04-09 ENCOUNTER — HOSPITAL ENCOUNTER (OUTPATIENT)
Dept: MAMMOGRAPHY | Facility: CLINIC | Age: 60
Discharge: HOME | End: 2025-04-09
Attending: FAMILY MEDICINE
Payer: COMMERCIAL

## 2025-04-09 DIAGNOSIS — Z12.31 VISIT FOR SCREENING MAMMOGRAM: ICD-10-CM

## 2025-04-09 PROCEDURE — 77063 BREAST TOMOSYNTHESIS BI: CPT

## 2025-04-09 PROCEDURE — 77067 SCR MAMMO BI INCL CAD: CPT

## 2025-04-11 ENCOUNTER — TRANSFERRED RECORDS (OUTPATIENT)
Dept: HEALTH INFORMATION MANAGEMENT | Facility: CLINIC | Age: 60
End: 2025-04-11
Payer: COMMERCIAL

## 2025-04-12 ENCOUNTER — TRANSFERRED RECORDS (OUTPATIENT)
Dept: HEALTH INFORMATION MANAGEMENT | Facility: CLINIC | Age: 60
End: 2025-04-12
Payer: COMMERCIAL

## 2025-07-14 ENCOUNTER — ANCILLARY PROCEDURE (OUTPATIENT)
Dept: GENERAL RADIOLOGY | Facility: CLINIC | Age: 60
End: 2025-07-14
Attending: FAMILY MEDICINE
Payer: COMMERCIAL

## 2025-07-14 ENCOUNTER — OFFICE VISIT (OUTPATIENT)
Dept: FAMILY MEDICINE | Facility: CLINIC | Age: 60
End: 2025-07-14
Payer: COMMERCIAL

## 2025-07-14 VITALS
OXYGEN SATURATION: 96 % | DIASTOLIC BLOOD PRESSURE: 64 MMHG | HEIGHT: 63 IN | RESPIRATION RATE: 16 BRPM | WEIGHT: 113 LBS | SYSTOLIC BLOOD PRESSURE: 97 MMHG | TEMPERATURE: 98.1 F | BODY MASS INDEX: 20.02 KG/M2 | HEART RATE: 69 BPM

## 2025-07-14 DIAGNOSIS — R10.30 LOWER ABDOMINAL PAIN, UNSPECIFIED: ICD-10-CM

## 2025-07-14 DIAGNOSIS — R53.83 FATIGUE, UNSPECIFIED TYPE: Primary | ICD-10-CM

## 2025-07-14 DIAGNOSIS — R05.3 CHRONIC COUGH: Primary | ICD-10-CM

## 2025-07-14 DIAGNOSIS — F30.9 BIPOLAR I DISORDER, SINGLE MANIC EPISODE (H): ICD-10-CM

## 2025-07-14 DIAGNOSIS — R05.3 CHRONIC COUGH: ICD-10-CM

## 2025-07-14 DIAGNOSIS — R94.6 THYROID FUNCTION TEST ABNORMAL: ICD-10-CM

## 2025-07-14 DIAGNOSIS — N18.31 STAGE 3A CHRONIC KIDNEY DISEASE (H): Chronic | ICD-10-CM

## 2025-07-14 DIAGNOSIS — R73.9 HYPERGLYCEMIA: ICD-10-CM

## 2025-07-14 DIAGNOSIS — R53.83 FATIGUE, UNSPECIFIED TYPE: ICD-10-CM

## 2025-07-14 DIAGNOSIS — R19.7 DIARRHEA OF PRESUMED INFECTIOUS ORIGIN: ICD-10-CM

## 2025-07-14 LAB
ALBUMIN SERPL BCG-MCNC: 4.4 G/DL (ref 3.5–5.2)
ALP SERPL-CCNC: 99 U/L (ref 40–150)
ALT SERPL W P-5'-P-CCNC: 21 U/L (ref 0–50)
ANION GAP SERPL CALCULATED.3IONS-SCNC: 12 MMOL/L (ref 7–15)
AST SERPL W P-5'-P-CCNC: 36 U/L (ref 0–45)
BASOPHILS # BLD AUTO: 0 10E3/UL (ref 0–0.2)
BASOPHILS NFR BLD AUTO: 1 %
BILIRUB SERPL-MCNC: 0.4 MG/DL
BILIRUBIN DIRECT (ROCHE PRO & PURE): 0.13 MG/DL (ref 0–0.45)
BUN SERPL-MCNC: 31.7 MG/DL (ref 8–23)
CALCIUM SERPL-MCNC: 9.6 MG/DL (ref 8.8–10.4)
CHLORIDE SERPL-SCNC: 107 MMOL/L (ref 98–107)
CREAT SERPL-MCNC: 1.15 MG/DL (ref 0.51–0.95)
EGFRCR SERPLBLD CKD-EPI 2021: 54 ML/MIN/1.73M2
EOSINOPHIL # BLD AUTO: 0.4 10E3/UL (ref 0–0.7)
EOSINOPHIL NFR BLD AUTO: 8 %
ERYTHROCYTE [DISTWIDTH] IN BLOOD BY AUTOMATED COUNT: 12.7 % (ref 10–15)
EST. AVERAGE GLUCOSE BLD GHB EST-MCNC: 105 MG/DL
GLUCOSE SERPL-MCNC: 94 MG/DL (ref 70–99)
HBA1C MFR BLD: 5.3 % (ref 0–5.6)
HCO3 SERPL-SCNC: 23 MMOL/L (ref 22–29)
HCT VFR BLD AUTO: 45.5 % (ref 35–47)
HGB BLD-MCNC: 14.3 G/DL (ref 11.7–15.7)
IMM GRANULOCYTES # BLD: 0 10E3/UL
IMM GRANULOCYTES NFR BLD: 0 %
LYMPHOCYTES # BLD AUTO: 1.8 10E3/UL (ref 0.8–5.3)
LYMPHOCYTES NFR BLD AUTO: 36 %
MCH RBC QN AUTO: 29.2 PG (ref 26.5–33)
MCHC RBC AUTO-ENTMCNC: 31.4 G/DL (ref 31.5–36.5)
MCV RBC AUTO: 93 FL (ref 78–100)
MONOCYTES # BLD AUTO: 0.6 10E3/UL (ref 0–1.3)
MONOCYTES NFR BLD AUTO: 13 %
NEUTROPHILS # BLD AUTO: 2.1 10E3/UL (ref 1.6–8.3)
NEUTROPHILS NFR BLD AUTO: 42 %
PLATELET # BLD AUTO: 261 10E3/UL (ref 150–450)
POTASSIUM SERPL-SCNC: 4.4 MMOL/L (ref 3.4–5.3)
PROT SERPL-MCNC: 7.2 G/DL (ref 6.4–8.3)
RBC # BLD AUTO: 4.89 10E6/UL (ref 3.8–5.2)
SODIUM SERPL-SCNC: 142 MMOL/L (ref 135–145)
T4 FREE SERPL-MCNC: 0.63 NG/DL (ref 0.9–1.7)
TSH SERPL DL<=0.005 MIU/L-ACNC: 1.59 UIU/ML (ref 0.3–4.2)
WBC # BLD AUTO: 4.9 10E3/UL (ref 4–11)

## 2025-07-14 PROCEDURE — 84439 ASSAY OF FREE THYROXINE: CPT | Performed by: FAMILY MEDICINE

## 2025-07-14 PROCEDURE — G2211 COMPLEX E/M VISIT ADD ON: HCPCS | Performed by: FAMILY MEDICINE

## 2025-07-14 PROCEDURE — 1126F AMNT PAIN NOTED NONE PRSNT: CPT | Performed by: FAMILY MEDICINE

## 2025-07-14 PROCEDURE — 82248 BILIRUBIN DIRECT: CPT | Performed by: FAMILY MEDICINE

## 2025-07-14 PROCEDURE — 3044F HG A1C LEVEL LT 7.0%: CPT | Performed by: FAMILY MEDICINE

## 2025-07-14 PROCEDURE — 36415 COLL VENOUS BLD VENIPUNCTURE: CPT | Performed by: FAMILY MEDICINE

## 2025-07-14 PROCEDURE — 83036 HEMOGLOBIN GLYCOSYLATED A1C: CPT | Performed by: FAMILY MEDICINE

## 2025-07-14 PROCEDURE — 99214 OFFICE O/P EST MOD 30 MIN: CPT | Performed by: FAMILY MEDICINE

## 2025-07-14 PROCEDURE — 85025 COMPLETE CBC W/AUTO DIFF WBC: CPT | Performed by: FAMILY MEDICINE

## 2025-07-14 PROCEDURE — 71046 X-RAY EXAM CHEST 2 VIEWS: CPT | Mod: TC | Performed by: RADIOLOGY

## 2025-07-14 PROCEDURE — 80053 COMPREHEN METABOLIC PANEL: CPT | Performed by: FAMILY MEDICINE

## 2025-07-14 PROCEDURE — 84443 ASSAY THYROID STIM HORMONE: CPT | Performed by: FAMILY MEDICINE

## 2025-07-14 PROCEDURE — 3078F DIAST BP <80 MM HG: CPT | Performed by: FAMILY MEDICINE

## 2025-07-14 PROCEDURE — 3074F SYST BP LT 130 MM HG: CPT | Performed by: FAMILY MEDICINE

## 2025-07-14 RX ORDER — BIMATOPROST 3 UG/ML
SOLUTION TOPICAL
COMMUNITY
Start: 2025-06-10

## 2025-07-14 ASSESSMENT — ANXIETY QUESTIONNAIRES
5. BEING SO RESTLESS THAT IT IS HARD TO SIT STILL: MORE THAN HALF THE DAYS
3. WORRYING TOO MUCH ABOUT DIFFERENT THINGS: NOT AT ALL
GAD7 TOTAL SCORE: 10
6. BECOMING EASILY ANNOYED OR IRRITABLE: NEARLY EVERY DAY
7. FEELING AFRAID AS IF SOMETHING AWFUL MIGHT HAPPEN: NOT AT ALL
2. NOT BEING ABLE TO STOP OR CONTROL WORRYING: SEVERAL DAYS
1. FEELING NERVOUS, ANXIOUS, OR ON EDGE: SEVERAL DAYS
IF YOU CHECKED OFF ANY PROBLEMS ON THIS QUESTIONNAIRE, HOW DIFFICULT HAVE THESE PROBLEMS MADE IT FOR YOU TO DO YOUR WORK, TAKE CARE OF THINGS AT HOME, OR GET ALONG WITH OTHER PEOPLE: SOMEWHAT DIFFICULT
GAD7 TOTAL SCORE: 10

## 2025-07-14 ASSESSMENT — PATIENT HEALTH QUESTIONNAIRE - PHQ9
SUM OF ALL RESPONSES TO PHQ QUESTIONS 1-9: 13
SUM OF ALL RESPONSES TO PHQ QUESTIONS 1-9: 13
10. IF YOU CHECKED OFF ANY PROBLEMS, HOW DIFFICULT HAVE THESE PROBLEMS MADE IT FOR YOU TO DO YOUR WORK, TAKE CARE OF THINGS AT HOME, OR GET ALONG WITH OTHER PEOPLE: SOMEWHAT DIFFICULT
5. POOR APPETITE OR OVEREATING: NEARLY EVERY DAY

## 2025-07-14 ASSESSMENT — ENCOUNTER SYMPTOMS
FATIGUE: 1
COUGH: 1

## 2025-07-14 ASSESSMENT — PAIN SCALES - GENERAL: PAINLEVEL_OUTOF10: NO PAIN (0)

## 2025-07-14 NOTE — PROGRESS NOTES
"  {PROVIDER CHARTING PREFERENCE:507818}    Subjective   Catherine is a 60 year old, presenting for the following health issues:  Fatigue (Very tired, trouble getting up in the morning, napping ), Cough (Cough up thick white phlegm, ), and Musculoskeletal Problem (Hx of right  hand pain has had before tingle numbness )        7/14/2025    11:56 AM   Additional Questions   Roomed by erasmo mondragon cma     Fatigue  Associated symptoms include coughing and fatigue.   Cough    Musculoskeletal Problem  Associated symptoms include coughing and fatigue.   History of Present Illness       Reason for visit:  Fatigue  upper resperitory cough with mucous also neuropothy of hands  Symptom onset:  3-4 weeks ago  Symptom intensity:  Moderate  Symptom progression:  Staying the same  Had these symptoms before:  No  What makes it worse:  No  What makes it better:  No She is missing 7 dose(s) of medications per week.        {MA/LPN/RN Pre-Provider Visit Orders- hCG/UA/Strep (Optional):159742}  {SUPERLIST (Optional):959683}  {additonal problems for provider to add (Optional):506064}    {ROS Picklists (Optional):984205}      Objective    BP 97/64 (BP Location: Left arm, Patient Position: Sitting, Cuff Size: Adult Regular)   Pulse 69   Temp 98.1  F (36.7  C)   Resp 16   Ht 1.6 m (5' 3\")   Wt 51.3 kg (113 lb)   LMP 07/21/2021   SpO2 96%   BMI 20.02 kg/m    Body mass index is 20.02 kg/m .  Physical Exam   {Exam List (Optional):305538}    {Diagnostic Test Results (Optional):143848}        Signed Electronically by: Marychuy Marion MD  {Email feedback regarding this note to [email or web address]   :390094}  " with platelets and differential    Thyroid function test abnormal  Patient's thyroid labs came back abnormal.  She actually had a normal TSH but a low T4.  I will refer her to endocrinology.  - Adult Endocrinology  Referral    Hyperglycemia  She also had mildly elevated glucose.  Will check an A1c.  - Hemoglobin A1c  - Hemoglobin A1c    I will get back to her on lab results and test results.  She is to follow-up with endocrinology.  And we will figure out if we need to send her to a pulmonologist as well, or if rheumatology would be more appropriate.    The longitudinal plan of care for the diagnosis(es)/condition(s) as documented were addressed during this visit. Due to the added complexity in care, I will continue to support Catherine in the subsequent management and with ongoing continuity of care.      Follow-up   She should follow-up according to lab results and symptoms.        Brad Alexander is a 60 year old, presenting for the following health issues:  Fatigue (Very tired, trouble getting up in the morning, napping ), Cough (Cough up thick white phlegm, ), and Musculoskeletal Problem (Hx of right  hand pain has had before tingle numbness )        7/14/2025    11:56 AM   Additional Questions   Roomed by erasmo mondragon cma     Fatigue  Associated symptoms include coughing and fatigue.   Cough    Musculoskeletal Problem  Associated symptoms include coughing and fatigue.   History of Present Illness       Reason for visit:  Fatigue  upper resperitory cough with mucous also neuropothy of hands  Symptom onset:  3-4 weeks ago  Symptom intensity:  Moderate  Symptom progression:  Staying the same  Had these symptoms before:  No  What makes it worse:  No  What makes it better:  No She is missing 7 dose(s) of medications per week.      This is a typically very healthy and active physically female.  She comes in complaining of fatigue and cough.  She notices the cough intermittently particularly in the morning or  "with exercise or just randomly.  It has been a while that she has had the fatigue.  She says she has very thick mucus that is white coming from her bronchi.  She has tried allergy meds which helped with postnasal drip symptoms but she just cannot get rid of the thick mucus that started 4 months ago.  She relates this all to the time she vacationed in [place] for a wedding and then almost immediately went to [facility] for a vacation.  This is when symptoms started cropping up.  Now she is exhausted, not going to the gym which is very abnormal, and needing to actually take naps.  She says she is not short of breath.  She also describes lower abdominal pain and diarrhea.  She has a history of mild chronic kidney disease, bipolar disorder and carpal tunnel syndrome.  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  She has some labs that she wants done for her psychiatrist Dr. Hernandez that should be faxed to the clinic she does not know the fax number but she does know the clinic phone number which is [phone].      Objective    BP 97/64 (BP Location: Left arm, Patient Position: Sitting, Cuff Size: Adult Regular)   Pulse 69   Temp 98.1  F (36.7  C)   Resp 16   Ht 1.6 m (5' 3\")   Wt 51.3 kg (113 lb)   LMP 07/21/2021   SpO2 96%   BMI 20.02 kg/m    Body mass index is 20.02 kg/m .  Physical Exam   GENERAL: alert, no distress, fit, and fatigued  EYES: Eyes grossly normal to inspection  HENT: nose and mouth without ulcers or lesions, oral mucous membranes moist, and without rhinorrhea  RESP: lungs clear to auscultation - no rales, rhonchi or wheezes and with intermittent bouts of nonproductive cough  CV: regular rates and rhythm  ABDOMEN: soft, nontender  MS: no gross musculoskeletal defects noted, no edema  PSYCH: mentation appears normal, affect flat, anxious, and fatigued    Results for orders placed or performed in visit on 07/14/25   XR Chest 2 Views     Status: None    Narrative    EXAM: XR " CHEST 2 VIEWS  LOCATION: [facility]  DATE: 7/14/2025    INDICATION:  Chronic cough  COMPARISON: 02/17/2020      Impression    IMPRESSION: No definite change since the prior examination. Minimal hyperinflation lungs. The lungs are otherwise clear. No pleural effusion or pneumothorax. Normal heart and mediastinal size. Tiny osteophytes thoracic spine.    No acute cardiopulmonary findings.   Results for orders placed or performed in visit on 07/14/25   TSH     Status: Normal   Result Value Ref Range    TSH 1.59 0.30 - 4.20 uIU/mL   T4, free     Status: Abnormal   Result Value Ref Range    Free T4 0.63 (L) 0.90 - 1.70 ng/dL   Basic metabolic panel  (Ca, Cl, CO2, Creat, Gluc, K, Na, BUN)     Status: Abnormal   Result Value Ref Range    Sodium 142 135 - 145 mmol/L    Potassium 4.4 3.4 - 5.3 mmol/L    Chloride 107 98 - 107 mmol/L    Carbon Dioxide (CO2) 23 22 - 29 mmol/L    Anion Gap 12 7 - 15 mmol/L    Urea Nitrogen 31.7 (H) 8.0 - 23.0 mg/dL    Creatinine 1.15 (H) 0.51 - 0.95 mg/dL    GFR Estimate 54 (L) >60 mL/min/1.73m2    Calcium 9.6 8.8 - 10.4 mg/dL    Glucose 94 70 - 99 mg/dL   Hepatic panel (Albumin, ALT, AST, Bili, Alk Phos, TP)     Status: Normal   Result Value Ref Range    Protein Total 7.2 6.4 - 8.3 g/dL    Albumin 4.4 3.5 - 5.2 g/dL    Bilirubin Total 0.4 <=1.2 mg/dL    Alkaline Phosphatase 99 40 - 150 U/L    AST 36 0 - 45 U/L    ALT 21 0 - 50 U/L    Bilirubin Direct 0.13 0.00 - 0.45 mg/dL   Hemoglobin A1c     Status: Normal   Result Value Ref Range    Estimated Average Glucose 105 <117 mg/dL    Hemoglobin A1C 5.3 0.0 - 5.6 %   CBC with platelets and differential     Status: Abnormal   Result Value Ref Range    WBC Count 4.9 4.0 - 11.0 10e3/uL    RBC Count 4.89 3.80 - 5.20 10e6/uL    Hemoglobin 14.3 11.7 - 15.7 g/dL    Hematocrit 45.5 35.0 - 47.0 %    MCV 93 78 - 100 fL    MCH 29.2 26.5 - 33.0 pg    MCHC 31.4 (L) 31.5 - 36.5 g/dL    RDW 12.7 10.0 - 15.0 %    Platelet Count 261 150 -  450 10e3/uL    % Neutrophils 42 %    % Lymphocytes 36 %    % Monocytes 13 %    % Eosinophils 8 %    % Basophils 1 %    % Immature Granulocytes 0 %    Absolute Neutrophils 2.1 1.6 - 8.3 10e3/uL    Absolute Lymphocytes 1.8 0.8 - 5.3 10e3/uL    Absolute Monocytes 0.6 0.0 - 1.3 10e3/uL    Absolute Eosinophils 0.4 0.0 - 0.7 10e3/uL    Absolute Basophils 0.0 0.0 - 0.2 10e3/uL    Absolute Immature Granulocytes 0.0 <=0.4 10e3/uL   CBC with platelets and differential     Status: Abnormal    Narrative    The following orders were created for panel order CBC with platelets and differential.  Procedure                               Abnormality         Status                     ---------                               -----------         ------                     CBC with platelets and ...[[id number]]  Abnormal            Final result                 Please view results for these tests on the individual orders.           Signed Electronically by: Marychuy Marion MD

## 2025-07-15 ENCOUNTER — RESULTS FOLLOW-UP (OUTPATIENT)
Dept: FAMILY MEDICINE | Facility: CLINIC | Age: 60
End: 2025-07-15
Payer: COMMERCIAL

## 2025-07-20 NOTE — TELEPHONE ENCOUNTER
Does the patient want to come in and check a Quantiferon Gold test and perhaps some autoimmune labs? Or see the pulmonologist?  Or wait and watch for now.  She may have chronic fatigue syndrome. She can make a follow-up appointment if she would like.